# Patient Record
Sex: FEMALE | Race: OTHER | HISPANIC OR LATINO | Employment: UNEMPLOYED | URBAN - METROPOLITAN AREA
[De-identification: names, ages, dates, MRNs, and addresses within clinical notes are randomized per-mention and may not be internally consistent; named-entity substitution may affect disease eponyms.]

---

## 2020-02-24 NOTE — PROGRESS NOTES
Assessment/Plan:    Allergic rhinitis  -recommend that the patient stop taking Benadryl for allergy sx and begin taking Allegra daily for better control   -also recommend that the patient consistently use Flonase daily   -Try to avoid: dust, pollen, mold, animal dander, tobacco smoke     Vertigo  -chronic  -does not take any medication  -is not currently having sx  -discussed that in the future if she has recurring sx  we can try meclizine and vestibular therapy     Family history of breast cancer  -the patient has multiple family members with breast cancer on the maternal side--her mother was positive for breast and uterine cancer   -mammogram ordered--patient has never had a mammogram previously   -no breast issues or pain     Class 2 obesity due to excess calories without serious comorbidity with body mass index (BMI) of 37 0 to 37 9 in adult  -Encouraged diet and lifestyle changes: decrease processed foods (cakes, cookies, chips, soda), decrease total carbohydrate intake, decrease fried/fatty foods, increase fruits and vegetables, increase lean proteins (chicken, turkey), increase healthy fats (avocado, fish, nuts), drink plenty of water (at least four 16 oz bottles per day)  -exercise 3-5 times per day         Return in about 4 weeks (around 3/24/2020) for fu pap smear   Patient Instructions   noel Vickidados ambulatorios   INFORMACIÓN GENERAL:   Las alergias  [place] reacción del sistema inmunológico a berry sustancia llamada alérgeno  El sistema inmunológico lo ve emory berry Peggyann Ang y lo ataca    Los siguientes son los síntomas más comunes:   · Estornudo y flujo, comezón o congestión nasal    · Ojos inflamados, acuosos o con comezón     · Paulo maurer, boca, oídos o garganta    · Inflamación, dolor o comezón en el área de la picadura de insecto  Busque cuidados inmediatos para los siguientes síntomas:   · Dificultad para tragar o inflamación de la garganta o lengua    · Sibilancias o dificultad para respirar    · [employer] o [employer]  [place]  [place]    · Dolor de pecho o palpitaciones irregulares  El tratamiento para las alergias  probablemente incluirá medicamentos para detener el avance de berry reacción alérgica seria  Es probable también que a usted le den [employer] que ayudan a disminuir la comezón, los estornudos y la inflamación o que pueden ayudar a que vargas nariz se sienta menos congestionada  Es posible que vargas proveedor de Pitmanjeet Enciso de distintos tipos de medicamentos para ayudarle a disminuir la inflamación, el enrojecimiento y la comezón  Estos medicamentos pueden venir en píldoras, inyecciones o aplicarse directamente en vargas piel  También puede usarse aerosoles nasales o gotas para los ojos  El tratamiento de desensibilización puede ayudarle a vargas cuerpo a acostumbrarse a los alérgenos que usted no puede evitar  Vargas proveedor de Pitmanjeet Munozes pondrá berry inyección que contiene berry pequeña cantidad de un alérgeno, dándole así a vargas cuerpo un poco más de tiempo para acostumbrarse al alérgeno  Vargas proveedor de priscila lo estará monitoreando de cerca y tratará la reacción alérgica que usted tenga  Vargas reacción al alérgeno puede terminar siendo menos seria después del tratamiento  Pregúntele a vargas proveedor de priscila por cuánto tiempo necesitará usted las inyecciones  [address] alergias:   · Use enjuagues nasales  Los proveedores de priscila podrían sugerir que enjuague los conductos nasales con berry solución salina  El lavado nasal diario podría ayudar a limpiar vargas nariz de alérgenos  · No fume  Es posible que los síntomas de alergia disminuyan si no está alrededor del humo  Si usted fuma, nunca es tarde para dejar de hacerlo  Pregunte a vargas proveedor de priscila por información acerca de cómo hacerlo si necesita ayuda  · [place] berry identificación de [employer]  Usted debería usar joyería de alerta médica o tener berry tarjeta que diga que usted tiene [facility]   Pregunte a vargas proveedor de priscila dónde conseguir berry identificación de alerta médica  Prevenga reacciones alérgicas:   · Evite reacciones a alergias estacionales  No salga afuera cuando el recuento de polen esté muy alto  Jaylin síntomas podrían mejorar si usted sale afuera solo por la mañana o la noche  Use el aire acondicionado y [employer] filtros de aire a [place]  · Desempolve y aspire vargas casa con frecuencia  para evitar reacciones alérgicas al polvo, pelos o moho  Se recomienda ponerse berry mascarilla cuando aspire vargas casa  Mantenga jaylin mascotas en ciertas habitaciones y báñelas con frecuencia  Use berry máquina para reducir la humedad y así ayudar a prevenir el moho  · No use productos que contengan látex  si usted es alérgico al látex  Si usted trabaja en cuidados de la priscila o preparando alimentos, utilice guantes sin látex  Siempre informe a los proveedores de slaud si usted tiene alergia al látex        · Evite picaduras de insectos  Evite áreas o actividades que [address] de [place]  Estos incluyen los botes de basura, la jardinería y wilian de [facility]  No use ropa de colores brillantes o perfumes lauryn al estar afuera  Programe berry adolfo con vargas proveedor de [employer] se le haya indicado: Anote jaylin preguntas para que se acuerde de hacerlas reyna jaylin visitas  ACUERDOS SOBRE VARGAS CUIDADO:   Usted tiene el derecho de participar en la planificación de vargas cuidado  Aprenda todo lo que pueda sobre vargas condición y emory darle tratamiento  Discuta con jaylin médicos jaylin opciones de tratamiento para juntos decidir el cuidado que usted quiere recibir  Usted siempre tiene el derecho a rechazar vargas tratamiento  Esta información es sólo para uso en educación  Vargas intención no es darle un consejo médico sobre enfermedades o tratamientos  Colsulte con vargas Liang Chill farmacéutico antes de seguir cualquier régimen médico para saber si es seguro y efectivo para usted    © 2014 [address] is for End User's use only and may not be sold, redistributed or otherwise used for commercial purposes  All illustrations and images included in CareNotes® are the copyrighted property of A D A M , Inc  or Jose Manuel Romero  Dieta saludable para el corazón   LO QUE NECESITA SABER:   Nicole Alvarado anisha para el corazón es un plan alimenticio bajo en grasas totales, grasas no saludables y sodio (sharon)  Nicole Alvarado saludable para el corazón ayuda a disminuir vargas riesgo de sufrir de enfermedades del Callie Grates y un derrame cerebral  Limite la cantidad de grasa que consume entre un 25% a 35% del total de josse calorías diarias  Limite el sodio por debajo de los 2,300 mg al día  [address]:   Grasas saludables:  Las grasas saludables ayudan a mejorar los niveles de [place]  El riesgo de berry enfermedad cardíaca se disminuye cuando los niveles de colesterol son normales  Escoja grasas saludables emory las siguientes:  · Las grasas no saturadas  se encuentran en alimentos emory el frijol de soja, aceites de [place], de [place], de [place] y de [place]  Se encuentra también en la margarina suave hecha con aceite líquido vegetal      · Las grasas Omega 3  se encuentran en ciertos pescados, emory el salmón, el atún y la [place], en las nueces y en la semilla de andrea  Grasas no saludables:  Las grasas "malas" pueden causar niveles perjudiciales de colesterol en vargas adama y aumentar el riesgo de berry enfermedad cardíaca  Limite el consumo de grasas no saludables, emory los siguientes:  · El colesterol  se encuentra en alimentos de origen animal, emory los huevos y la langosta al igual que en productos lácteos hechos con leche entera  Limite el consumo de colesterol a menos de 300 milligramos (mg) al día  Es posible que necesite limitar el colesterol a 200 mg al día si sufre de berry enfermedad cardíaca  · Las grasas saturadas  se encuentran en kaitlyn emory el tocino y la hamburguesa   Estas se encuentran también en la piel del good y del Diandra Anderson y mantequilla  Limite el consumo de grasas saturadas a menos del 7% del total de josse calorías diarias  Limite las grasas saturadas a menos del 6% si usted tiene enfermedad cardíaca o tiene un mayor riesgo para esto  · La grasa trans  se encuentran en los alimentos envasados, emory las papitas de bolsa y las [place]  También se encuentra en la margarina dura, algunos alimentos fritos y la manteca  Evite lo más que [employer] trans  Alimentos y bebidas saludables para el corazón para incluir:  Solicite que vargas nutricionista o el médico le indique cuántas porciones necesita consumir de los siguientes alimentos de cada valerie alimenticio:  · Granos:      ¨ Panes, cereales y pastas de harina integral y Marcy Gallery integral    ¨ Patatas fritas y galletas saladas bajas en grasa, bajas en sodio    · Verduras:      ¨ Brócoli, judías verdes, guisantes y espinacas    ¨ [place], col y habas    ¨ Zanahorias, patatas dulces, tomates y pimientos    ¨ Vegetales enlatados sin sharon añadida    · Frutas:      ¨ Plátanos, melocotones, peras y levin    ¨ Uvas, pasas y dátiles    ¨ Anyi, Raheem Che, Yue, [facility] y [facility]    ¨ [place], [place], melón y papaya    ¨ Clemente mount y fresas    ¨ Conservas de frutas sin azúcares añadidos    · Productos lácteos bajos en grasa:      ¨ Leche sin grasa (descremada), leche 1%, leche baja en grasa de [place], anacardo o leche de soja fortificada con calcio    ¨ Queso cottage, queso bajo en grasa y yogur regular o congelado    · Teo y proteínas , chester de carne [place] de res o cerdo (chuletas, pierna, oseas), el good y el pavo sin piel, legumbres, productos de soya, las claras del huevo y nueces o kina secos    Alimentos y bebidas que debe limitar o evitar:  Pregúntele a vargas médico o nutricionista sobre estos y otros alimentos que contienen altos niveles de maria guadalupe Calzada y Ramin que no son saludables:  · [employer] , emory las comidas Chau Nix con queso y cereales con más de 300 mg de sodio por porción    · Productos enlatados o mezclas secas  para pasteles, sopas o salsas    · Verduras con sodio agregada , emory las marilu instantáneas, verduras con salsas agregadas o conservas regulares de verduras    · Otros alimentos altos en sodio , emory la salsa de [place], salsa de [facility], aderezo para [place], encurtidos de [place], UPPER STONE, salsa de soya y miso    · Alimentos lácteos con alto contenido de grasa  emory leche entera o 2%, queso crema o crema agria y quesos     · Alimentos con proteínas altas en grasa  chester de carne ([address], las chuletas con hueso), el good o pavo sin piel y las vísceras de animal emory el hígado    · Teo curadas o [place] , emory las salchichas, el tocino y salchichones    · Aceites y grasas poco saludables , emory la [place], margarina en Anh Crumb y aceites para cocinar emory el aceite de ted o arellano    · Alimentos y bebidas altas en azúcar , tales emory refrescos (gaseosas), bebidas deportivas, té azucarado, dulces, pasteles, galletas, tartas y donas  Otras pautas dietéticas que debe seguir:   · Consuma más alimentos que contengan grasas Omega-3  Consuma pescado con altas cantidades de Omega-3 por lo menos 2 veces a la semana  · Limite el consumo de alcohol  Demasiado alcohol puede dañar vargas corazón y elevar vargas presión arterial  Las mujeres deberían limitar el consumo de alcohol a 1 bebida por día  Los hombres deberían limitar el consumo de alcohol a 2 tragos al día  Un trago equivale a 12 onzas de cerveza, 5 onzas de vino o 1 onza y ½ de licor  · Escoja alimentos bajos en sodio  Alimentos altos de sodio pueden conducir a la hipertensión  Al preparar la comida añada muy poca sal o no use sal  Use hierbas y especias en vez de la sal     · Consuma más fibra  para ayudar a bajar los niveles de [place]  Consuma al menos 5 porciones de frutas y verduras todos los días   Consuma 3 onzas de alimentos integrales al día  Obey Arnulfo (fríjoles) son [facility] buena sean de [place]  Pautas de estilo de diego:   · No fume  La nicotina y otros químicos contenidos en los cigarrillos y cigarros pueden causar daño a josse pulmones y el corazón  Pida información a gardner médico si usted actualmente fuma y necesita ayuda para dejar de fumar  Los cigarrillos electrónicos o tabaco sin humo todavía contienen nicotina  Consulte con gardner médico antes de [employer]  · Lorelle Cecilia regularmente  para que le ayude a mantener un peso saludable y mejorar gardner presión arterial y niveles de colesterol  Pregunte a gardner médico acerca del mejor plan de ejercicio para usted  No empiece un programa de ejercicios sin antes consultar con gardner Sunil Parcel a josse consultas de control con gardner médico según le indicaron  Anote josse preguntas para que se acuerde de hacerlas reyna josse visitas  © 2017 [facility] Information is for End User's use only and may not be sold, redistributed or otherwise used for commercial purposes  All illustrations and images included in CareNotes® are the copyrighted property of A D A M , Inc  or Jose Manuel Romero  Esta información es sólo para uso en educación  Gardner intención no es darle un consejo médico sobre enfermedades o tratamientos  Colsulte con gardner Clarine Sours farmacéutico antes de seguir cualquier régimen médico para saber si es seguro y efectivo para usted  Diagnoses and all orders for this visit:    Allergic rhinitis, unspecified seasonality, unspecified trigger  -     albuterol (PROVENTIL HFA,VENTOLIN HFA) 90 mcg/act inhaler; Inhale 2 puffs every 6 (six) hours as needed for wheezing or shortness of breath  -     fluticasone (FLONASE) 50 mcg/act nasal spray; 1 spray into each nostril daily  -     fexofenadine (ALLEGRA) 180 MG tablet;  Take 1 tablet (180 mg total) by mouth daily    Cervical cancer screening    Breast cancer screening  -     Mammo screening bilateral w cad; Future    Class 2 obesity due to excess calories without serious comorbidity with body mass index (BMI) of 37 0 to 37 9 in adult  -     CBC and differential; Future  -     Comprehensive metabolic panel; Future  -     Hemoglobin A1C; Future  -     Lipid panel; Future  -     TSH, 3rd generation with Free T4 reflex; Future    Screening for HIV (human immunodeficiency virus)  -     Human Immunodeficiency Virus 1/2 Antigen / Antibody ( Fourth Generation) with Reflex Testing; Future    Family history of breast cancer    Vertigo    Other orders  -     diphenhydrAMINE (BENADRYL) 25 mg tablet; Take 50 mg by mouth daily at bedtime as needed for itching          Subjective:     Quoc Peguero is a 37 y o  female who  has no past medical history on file  who presented to the office today for establish care  HPI  Patient presents today to establish care  She has a PMH of allergic rhinitis, vertigo  She has a PMH of cholecystectomy, , and tubal ligation  She reports that she is currently taking 50 mg of Benadryl at HS for allergy sx  She reports her sx are not well controlled  She has previously tried Zyrtec and Allegra and reports that they are not effective after a while  She has a family hx significant for breast cancer in several members of her maternal family including her mother who also passed away due to uterine cancer at 51 yo  The patient reports that her menses are regular with no issues  She denies use of any tobacco, drugs, or alcohol       The following portions of the patient's history were reviewed and updated as appropriate: allergies, current medications, past family history, past medical history, past social history, past surgical history and problem list     Current Outpatient Medications on File Prior to Visit   Medication Sig Dispense Refill    diphenhydrAMINE (BENADRYL) 25 mg tablet Take 50 mg by mouth daily at bedtime as needed for itching       No current facility-administered medications on file prior to visit  Review of Systems   Constitutional: Negative for activity change, appetite change, chills, fatigue, fever and unexpected weight change  HENT: Positive for congestion and postnasal drip  Negative for hearing loss, nosebleeds, sinus pain, sneezing, sore throat and trouble swallowing  Eyes: Positive for itching  Negative for photophobia and visual disturbance  Respiratory: Negative for cough, chest tightness, shortness of breath and wheezing  Cardiovascular: Negative for chest pain, palpitations and leg swelling  Gastrointestinal: Negative for abdominal pain, constipation, nausea and vomiting  Genitourinary: Negative for decreased urine volume, difficulty urinating, dysuria, flank pain, genital sores, hematuria and urgency  Musculoskeletal: Negative for back pain and gait problem  Skin: Negative for pallor, rash and wound  Neurological: Negative for dizziness, seizures, syncope, weakness, numbness and headaches  Hematological: Negative for adenopathy  Does not bruise/bleed easily  Psychiatric/Behavioral: Negative for confusion, hallucinations, self-injury, sleep disturbance and suicidal ideas  The patient is not nervous/anxious  BMI Counseling: Body mass index is 37 68 kg/m²  The BMI is above normal  Nutrition recommendations include decreasing portion sizes, encouraging healthy choices of fruits and vegetables, decreasing fast food intake, consuming healthier snacks and limiting drinks that contain sugar  Objective:    /70 (BP Location: Left arm, Patient Position: Sitting, Cuff Size: Standard)   Pulse 78   Temp (!) 97 2 °F (36 2 °C) (Temporal)   Resp 13   Ht 5' 2" (1 575 m)   Wt 93 4 kg (206 lb)   LMP 02/06/2020   SpO2 98%   BMI 37 68 kg/m²     Physical Exam   Constitutional: She is oriented to person, place, and time  She appears well-developed and well-nourished  No distress     HENT:   Head: Normocephalic and atraumatic  Right Ear: Tympanic membrane normal    Left Ear: Tympanic membrane normal    Nose: Mucosal edema present  +PND   Eyes: Pupils are equal, round, and reactive to light  EOM are normal    Neck: Normal range of motion  Neck supple  Cardiovascular: Normal rate, regular rhythm, normal heart sounds and intact distal pulses  Exam reveals no gallop and no friction rub  No murmur heard  Pulmonary/Chest: Effort normal and breath sounds normal  No respiratory distress  She has no wheezes  Abdominal: Soft  Bowel sounds are normal  She exhibits no distension  There is no tenderness  There is no rebound and no guarding  Musculoskeletal: Normal range of motion  She exhibits no edema or deformity  Lymphadenopathy:     She has no cervical adenopathy  Neurological: She is alert and oriented to person, place, and time  She displays normal reflexes  No sensory deficit  Coordination normal    Skin: Skin is warm and dry  Capillary refill takes less than 2 seconds  No rash noted  She is not diaphoretic  Psychiatric: She has a normal mood and affect  Her behavior is normal    Nursing note and vitals reviewed        MIRANDA Stark  02/25/20  11:46 AM

## 2020-02-25 ENCOUNTER — OFFICE VISIT (OUTPATIENT)
Dept: FAMILY MEDICINE CLINIC | Facility: CLINIC | Age: 40
End: 2020-02-25

## 2020-02-25 VITALS
BODY MASS INDEX: 37.91 KG/M2 | HEART RATE: 78 BPM | WEIGHT: 206 LBS | RESPIRATION RATE: 13 BRPM | HEIGHT: 62 IN | TEMPERATURE: 97.2 F | OXYGEN SATURATION: 98 % | SYSTOLIC BLOOD PRESSURE: 124 MMHG | DIASTOLIC BLOOD PRESSURE: 70 MMHG

## 2020-02-25 DIAGNOSIS — E66.09 CLASS 2 OBESITY DUE TO EXCESS CALORIES WITHOUT SERIOUS COMORBIDITY WITH BODY MASS INDEX (BMI) OF 37.0 TO 37.9 IN ADULT: ICD-10-CM

## 2020-02-25 DIAGNOSIS — R42 VERTIGO: ICD-10-CM

## 2020-02-25 DIAGNOSIS — J30.9 ALLERGIC RHINITIS, UNSPECIFIED SEASONALITY, UNSPECIFIED TRIGGER: Primary | ICD-10-CM

## 2020-02-25 DIAGNOSIS — Z12.39 BREAST CANCER SCREENING: ICD-10-CM

## 2020-02-25 DIAGNOSIS — Z12.4 CERVICAL CANCER SCREENING: ICD-10-CM

## 2020-02-25 DIAGNOSIS — Z80.3 FAMILY HISTORY OF BREAST CANCER: ICD-10-CM

## 2020-02-25 DIAGNOSIS — Z11.4 SCREENING FOR HIV (HUMAN IMMUNODEFICIENCY VIRUS): ICD-10-CM

## 2020-02-25 PROBLEM — E66.812 CLASS 2 OBESITY DUE TO EXCESS CALORIES WITHOUT SERIOUS COMORBIDITY WITH BODY MASS INDEX (BMI) OF 37.0 TO 37.9 IN ADULT: Status: ACTIVE | Noted: 2020-02-25

## 2020-02-25 PROCEDURE — 1036F TOBACCO NON-USER: CPT | Performed by: NURSE PRACTITIONER

## 2020-02-25 PROCEDURE — 99204 OFFICE O/P NEW MOD 45 MIN: CPT | Performed by: NURSE PRACTITIONER

## 2020-02-25 PROCEDURE — 3008F BODY MASS INDEX DOCD: CPT | Performed by: NURSE PRACTITIONER

## 2020-02-25 RX ORDER — FEXOFENADINE HCL 180 MG/1
180 TABLET ORAL DAILY
Qty: 90 TABLET | Refills: 2 | Status: SHIPPED | OUTPATIENT
Start: 2020-02-25 | End: 2021-11-17

## 2020-02-25 RX ORDER — ALBUTEROL SULFATE 90 UG/1
2 AEROSOL, METERED RESPIRATORY (INHALATION) EVERY 6 HOURS PRN
Qty: 1 INHALER | Refills: 5 | Status: SHIPPED | OUTPATIENT
Start: 2020-02-25 | End: 2021-11-17

## 2020-02-25 RX ORDER — DIPHENHYDRAMINE HCL 25 MG
50 TABLET ORAL
COMMUNITY

## 2020-02-25 RX ORDER — FLUTICASONE PROPIONATE 50 MCG
1 SPRAY, SUSPENSION (ML) NASAL DAILY
Qty: 1 BOTTLE | Refills: 3 | Status: SHIPPED | OUTPATIENT
Start: 2020-02-25 | End: 2021-11-17 | Stop reason: SDUPTHER

## 2020-02-25 NOTE — PATIENT INSTRUCTIONS
Alergias, cuidados ambulatorios   INFORMACIÓN GENERAL:   Las alergias  son Babetta Budds reacción del sistema inmunológico a berry sustancia llamada alérgeno  El sistema inmunológico lo ve emory berry Candice  y lo ataca  Los siguientes son los síntomas más comunes:   · Estornudo y flujo, comezón o congestión nasal    · Ojos inflamados, acuosos o con comezón     · Paulo maurer, boca, oídos o garganta    · Inflamación, dolor o comezón en el área de la picadura de insecto  Busque cuidados inmediatos para los siguientes síntomas:   · Dificultad para tragar o inflamación de la garganta o lengua    · Sibilancias o dificultad para respirar    · The TJX independenceIT o LEV Correa    · Dolor de pecho o palpitaciones irregulares  El tratamiento para las alergias  probablemente incluirá medicamentos para detener el avance de berry reacción alérgica seria  Es probable también que a usted le den [employer] que ayudan a disminuir la comezón, los estornudos y la inflamación o que pueden ayudar a que gardner nariz se sienta menos congestionada  Es posible que gardner proveedor de July Enciso de distintos tipos de medicamentos para ayudarle a disminuir la inflamación, el enrojecimiento y la comezón  Estos medicamentos pueden venir en píldoras, inyecciones o aplicarse directamente en gardner piel  También puede usarse aerosoles nasales o gotas para los ojos  El tratamiento de desensibilización puede ayudarle a gardner cuerpo a acostumbrarse a los alérgenos que usted no puede evitar  Gardner proveedor de July Enciso pondrá berry inyección que contiene berry pequeña cantidad de un alérgeno, dándole así a gardner cuerpo un poco más de tiempo para acostumbrarse al alérgeno  Gardner proveedor de priscila lo estará monitoreando de cerca y tratará la reacción alérgica que usted tenga  Gardner reacción al alérgeno puede terminar siendo menos seria después del tratamiento  Pregúntele a gardner proveedor de priscila por cuánto tiempo necesitará usted las inyecciones  [address] alergias:   · Use enjuagues nasales    Los proveedores de priscila podrían sugerir que enjuague los conductos nasales con berry solución salina  El lavado nasal diario podría ayudar a limpiar vargas nariz de alérgenos  · No fume  Es posible que los síntomas de alergia disminuyan si no está alrededor del humo  Si usted fuma, nunca es tarde para dejar de hacerlo  Pregunte a vargas proveedor de priscila por información acerca de cómo hacerlo si necesita ayuda  · [place] berry identificación de [employer]  Usted debería usar joyería de alerta médica o tener berry tarjeta que diga que usted tiene [place]  Pregunte a vargas proveedor de priscila dónde conseguir berry identificación de alerta médica  Prevenga reacciones alérgicas:   · Evite reacciones a alergias estacionales  No salga afuera cuando el recuento de polen esté muy alto  Jaylin síntomas podrían mejorar si usted sale afuera solo por la mañana o la noche  Use el aire acondicionado y [employer] filtros de aire a [place]  · Desempolve y aspire vargas casa con frecuencia  para evitar reacciones alérgicas al polvo, pelos o moho  Se recomienda ponerse berry mascarilla cuando aspire vargas casa  Mantenga jaylin mascotas en ciertas habitaciones y báñelas con frecuencia  Use berry máquina para reducir la humedad y así ayudar a prevenir el moho  · No use productos que contengan látex  si usted es alérgico al látex  Si usted trabaja en cuidados de la priscila o preparando alimentos, utilice guantes sin látex  Siempre informe a los proveedores de slaud si usted tiene alergia al látex        · Evite picaduras de insectos  Evite áreas o actividades que [address] de [place]  Estos incluyen los botes de basura, la jardinería y wilian de [facility]  No use ropa de colores brillantes o perfumes lauryn al estar afuera  Programe berry adolfo con vargas proveedor de [employer] se le haya indicado: Anote jaylin preguntas para que se acuerde de hacerlas reyna jaylin visitas    ACUERDOS SOBRE VARGAS CUIDADO:   Usted tiene el derecho de participar en la planificación de gardner cuidado  Aprenda todo lo que pueda sobre gardner condición y emory darle tratamiento  Discuta con josse médicos josse opciones de tratamiento para juntos decidir el cuidado que usted quiere recibir  Usted siempre tiene el derecho a rechazar gardner tratamiento  Esta información es sólo para uso en educación  Gardner intención no es darle un consejo médico sobre enfermedades o tratamientos  Colsulte con gardner [place] Canny farmacéutico antes de seguir cualquier régimen médico para saber si es seguro y efectivo para usted  © 2014 [address] is for End User's use only and may not be sold, redistributed or otherwise used for commercial purposes  All illustrations and images included in CareNotes® are the copyrighted property of A D A M , Inc  or Jose Manuel Romero  Dieta saludable para el corazón   LO QUE NECESITA SABER:   Wonda Merry anisha para el corazón es un plan alimenticio bajo en grasas totales, grasas no saludables y sodio (sharon)  Wonda Merry saludable para el corazón ayuda a disminuir gardner riesgo de sufrir de enfermedades del Jackquline Feeling y un derrame cerebral  Limite la cantidad de grasa que consume entre un 25% a 35% del total de josse calorías diarias  Limite el sodio por debajo de los 2,300 mg al día  [address]:   Grasas saludables:  Las grasas saludables ayudan a mejorar los niveles de [place]  El riesgo de berry enfermedad cardíaca se disminuye cuando los niveles de colesterol son normales  Escoja grasas saludables emory las siguientes:  · Las grasas no saturadas  se encuentran en alimentos emory el frijol de soja, aceites de canola, de [place], de [place] y de [place]  Se encuentra también en la margarina suave hecha con aceite líquido vegetal      · Las grasas Omega 3  se encuentran en ciertos pescados, emory el salmón, el atún y la [place], en las nueces y en la semilla de andrea    Grasas no saludables:  Las grasas "malas" pueden causar [employer] perjudiciales de colesterol en vargas adama y aumentar el riesgo de berry enfermedad cardíaca  Limite el consumo de grasas no saludables, emory los siguientes:  · El colesterol  se encuentra en alimentos de origen animal, emory los huevos y la langosta al igual que en productos lácteos hechos con leche entera  Limite el consumo de colesterol a menos de 300 milligramos (mg) al día  Es posible que necesite limitar el colesterol a 200 mg al día si sufre de berry enfermedad cardíaca  · Las grasas saturadas  se encuentran en kaitlyn emory el tocino y la hamburguesa  Estas se encuentran también en la piel del good y del [place], [place] entera y [place]  Limite el consumo de grasas saturadas a menos del 7% del total de josse calorías diarias  Limite las grasas saturadas a menos del 6% si usted tiene enfermedad cardíaca o tiene un mayor riesgo para esto  · La grasa trans  se encuentran en los alimentos envasados, emory las papitas de bolsa y las [place]  También se encuentra en la margarina dura, algunos alimentos fritos y la manteca  Evite lo más que [employer] trans    Alimentos y bebidas saludables para el corazón para incluir:  Solicite que vargas nutricionista o el médico le indique cuántas porciones necesita consumir de los siguientes alimentos de cada valerie alimenticio:  · Granos:      ¨ Panes, cereales y pastas de harina integral y Hulon Tripathi integral    ¨ Patatas fritas y galletas saladas bajas en grasa, bajas en sodio    · Verduras:      ¨ Brócoli, judías verdes, guisantes y espinacas    ¨ [place], col y habas    ¨ Zanahorias, patatas dulces, tomates y pimientos    ¨ Vegetales enlatados sin sharon añadida    · Frutas:      ¨ Plátanos, melocotones, peras y levin    ¨ Uvas, pasas y dátiles    ¨ Raheem De Santiago, Yue, [facility] de [facility] y [facility]    ¨ [place], [place], melón y papaya    ¨ Clemente mount y fresas    ¨ Conservas de frutas sin azúcares añadidos    · Productos lácteos bajos en grasa:      Fam General sin grasa (descremada), leche 1%, leche baja en grasa de [place], anacardo o [place] de soja fortificada con calcio    ¨ Queso cottage, queso bajo en grasa y yogur regular o congelado    · Rebeccaside y proteínas , chester de carne [place] de res o cerdo (chuletas, pierna, oseas), el good y el pavo sin piel, legumbres, productos de soya, las claras del huevo y nueces o kina secos  Alimentos y bebidas que debe limitar o evitar:  Pregúntele a vargas médico o nutricionista sobre estos y otros alimentos que contienen altos niveles de maria guadalupe Calzada y Ramin que no son saludables:  · [employer] , emory las comidas congeladas, [place], macarrón con queso y cereales con más de 300 mg de sodio por porción    · Productos enlatados o mezclas secas  para pasteles, sopas o salsas    · Verduras con sodio agregada , emory las marilu instantáneas, verduras con salsas agregadas o conservas regulares de verduras    · Otros alimentos altos en sodio , emory la salsa de [place], salsa de [facility], aderezo para [place], encurtidos de [place], UPPER [place], salsa de soya y miso    · Alimentos lácteos con alto contenido de grasa  emory leche entera o 2%, queso crema o crema agria y quesos     · Alimentos con proteínas altas en grasa  chester de carne ([address], las chuletas con hueso), el good o pavo sin piel y las vísceras de animal emory el hígado    · Teo curadas o [place] , emory las salchichas, el tocino y salchichones    · Aceites y grasas poco saludables , emory la [place], margarina en Cale Gamma y aceites para cocinar emory el aceite de ted o arellano    · Alimentos y bebidas altas en azúcar , tales emory refrescos (gaseosas), bebidas deportivas, té azucarado, dulces, pasteles, galletas, tartas y donas  Otras pautas dietéticas que debe seguir:   · Consuma más alimentos que contengan grasas Omega-3  Consuma pescado con altas cantidades de Omega-3 por lo menos 2 veces a la semana  · Limite el consumo de alcohol    Akash Robert alcohol puede dañar gardner corazón y elevar gardner presión arterial  Las mujeres deberían limitar el consumo de alcohol a 1 bebida por día  Los hombres deberían limitar el consumo de alcohol a 2 tragos al día  Un trago equivale a 12 onzas de cerveza, 5 onzas de vino o 1 onza y ½ de licor  · Escoja alimentos bajos en sodio  Alimentos altos de sodio pueden conducir a la hipertensión  Al preparar la comida añada muy poca sal o no use sal  Use hierbas y especias en vez de la sal     · Consuma más fibra  para ayudar a bajar los niveles de [place]  Consuma al menos 5 porciones de frutas y verduras todos los días  Consuma 3 onzas de alimentos integrales al día  Janel Mercedes [facility]) son Evern Naas buena sean de [place]  [place] de estilo de diego:   · No fume  La nicotina y otros químicos contenidos en los cigarrillos y cigarros pueden causar daño a josse pulmones y el corazón  Pida información a gardner médico si usted actualmente fuma y necesita ayuda para dejar de fumar  Los cigarrillos electrónicos o tabaco sin humo todavía contienen nicotina  Consulte con gardner médico antes de [employer]  · Arby Sniff regularmente  para que le ayude a mantener un peso saludable y mejorar gardner presión arterial y niveles de colesterol  Pregunte a gardner médico acerca del mejor plan de ejercicio para usted  No empiece un programa de ejercicios sin antes consultar con gardner Tara Eth a josse consultas de control con gardner médico según le indicaron  Anote josse preguntas para que se acuerde de hacerlas reyna josse visitas  © 2017 [address] is for End User's use only and may not be sold, redistributed or otherwise used for commercial purposes  All illustrations and images included in CareNotes® are the copyrighted property of A D A M , Inc  or Jose Manuel Romero  Esta información es sólo para uso en educación  Gardner intención no es darle un consejo médico sobre enfermedades o tratamientos   Colsulte con gardner Chastity Lopezuver farmacéutico antes de seguir cualquier régimen médico para saber si es seguro y efectivo para usted

## 2020-02-25 NOTE — ASSESSMENT & PLAN NOTE
-Encouraged diet and lifestyle changes: decrease processed foods (cakes, cookies, chips, soda), decrease total carbohydrate intake, decrease fried/fatty foods, increase fruits and vegetables, increase lean proteins (chicken, turkey), increase healthy fats (avocado, fish, nuts), drink plenty of water (at least four 16 oz bottles per day)  -exercise 3-5 times per day

## 2020-02-25 NOTE — ASSESSMENT & PLAN NOTE
-chronic  -does not take any medication  -is not currently having sx  -discussed that in the future if she has recurring sx  we can try meclizine and vestibular therapy

## 2020-02-25 NOTE — ASSESSMENT & PLAN NOTE
-recommend that the patient stop taking Benadryl for allergy sx and begin taking Allegra daily for better control   -also recommend that the patient consistently use Flonase daily   -Try to avoid: dust, pollen, mold, animal dander, tobacco smoke

## 2020-02-25 NOTE — ASSESSMENT & PLAN NOTE
-the patient has multiple family members with breast cancer on the maternal side--her mother was positive for breast and uterine cancer   -mammogram ordered--patient has never had a mammogram previously   -no breast issues or pain

## 2020-02-27 ENCOUNTER — APPOINTMENT (OUTPATIENT)
Dept: LAB | Facility: CLINIC | Age: 40
End: 2020-02-27
Payer: COMMERCIAL

## 2020-02-27 DIAGNOSIS — E66.09 CLASS 2 OBESITY DUE TO EXCESS CALORIES WITHOUT SERIOUS COMORBIDITY WITH BODY MASS INDEX (BMI) OF 37.0 TO 37.9 IN ADULT: ICD-10-CM

## 2020-02-27 DIAGNOSIS — Z11.4 SCREENING FOR HIV (HUMAN IMMUNODEFICIENCY VIRUS): ICD-10-CM

## 2020-02-27 LAB
ALBUMIN SERPL BCP-MCNC: 4.1 G/DL (ref 3.5–5)
ALP SERPL-CCNC: 68 U/L (ref 46–116)
ALT SERPL W P-5'-P-CCNC: 18 U/L (ref 12–78)
ANION GAP SERPL CALCULATED.3IONS-SCNC: 6 MMOL/L (ref 4–13)
AST SERPL W P-5'-P-CCNC: 10 U/L (ref 5–45)
BASOPHILS # BLD AUTO: 0.09 THOUSANDS/ΜL (ref 0–0.1)
BASOPHILS NFR BLD AUTO: 1 % (ref 0–1)
BILIRUB SERPL-MCNC: 0.46 MG/DL (ref 0.2–1)
BUN SERPL-MCNC: 12 MG/DL (ref 5–25)
CALCIUM SERPL-MCNC: 8.7 MG/DL (ref 8.3–10.1)
CHLORIDE SERPL-SCNC: 109 MMOL/L (ref 100–108)
CHOLEST SERPL-MCNC: 158 MG/DL (ref 50–200)
CO2 SERPL-SCNC: 23 MMOL/L (ref 21–32)
CREAT SERPL-MCNC: 0.84 MG/DL (ref 0.6–1.3)
EOSINOPHIL # BLD AUTO: 0.07 THOUSAND/ΜL (ref 0–0.61)
EOSINOPHIL NFR BLD AUTO: 1 % (ref 0–6)
ERYTHROCYTE [DISTWIDTH] IN BLOOD BY AUTOMATED COUNT: 14 % (ref 11.6–15.1)
EST. AVERAGE GLUCOSE BLD GHB EST-MCNC: 103 MG/DL
GFR SERPL CREATININE-BSD FRML MDRD: 87 ML/MIN/1.73SQ M
GLUCOSE P FAST SERPL-MCNC: 74 MG/DL (ref 65–99)
HBA1C MFR BLD: 5.2 %
HCT VFR BLD AUTO: 42 % (ref 34.8–46.1)
HDLC SERPL-MCNC: 51 MG/DL
HGB BLD-MCNC: 13 G/DL (ref 11.5–15.4)
IMM GRANULOCYTES # BLD AUTO: 0.03 THOUSAND/UL (ref 0–0.2)
IMM GRANULOCYTES NFR BLD AUTO: 0 % (ref 0–2)
LDLC SERPL CALC-MCNC: 91 MG/DL (ref 0–100)
LYMPHOCYTES # BLD AUTO: 2.28 THOUSANDS/ΜL (ref 0.6–4.47)
LYMPHOCYTES NFR BLD AUTO: 29 % (ref 14–44)
MCH RBC QN AUTO: 29.1 PG (ref 26.8–34.3)
MCHC RBC AUTO-ENTMCNC: 31 G/DL (ref 31.4–37.4)
MCV RBC AUTO: 94 FL (ref 82–98)
MONOCYTES # BLD AUTO: 0.58 THOUSAND/ΜL (ref 0.17–1.22)
MONOCYTES NFR BLD AUTO: 7 % (ref 4–12)
NEUTROPHILS # BLD AUTO: 4.81 THOUSANDS/ΜL (ref 1.85–7.62)
NEUTS SEG NFR BLD AUTO: 62 % (ref 43–75)
NONHDLC SERPL-MCNC: 107 MG/DL
NRBC BLD AUTO-RTO: 0 /100 WBCS
PLATELET # BLD AUTO: 363 THOUSANDS/UL (ref 149–390)
PMV BLD AUTO: 11.9 FL (ref 8.9–12.7)
POTASSIUM SERPL-SCNC: 3.6 MMOL/L (ref 3.5–5.3)
PROT SERPL-MCNC: 7.7 G/DL (ref 6.4–8.2)
RBC # BLD AUTO: 4.46 MILLION/UL (ref 3.81–5.12)
SODIUM SERPL-SCNC: 138 MMOL/L (ref 136–145)
TRIGL SERPL-MCNC: 79 MG/DL
TSH SERPL DL<=0.05 MIU/L-ACNC: 1.69 UIU/ML (ref 0.36–3.74)
WBC # BLD AUTO: 7.86 THOUSAND/UL (ref 4.31–10.16)

## 2020-02-27 PROCEDURE — 87389 HIV-1 AG W/HIV-1&-2 AB AG IA: CPT

## 2020-02-27 PROCEDURE — 85025 COMPLETE CBC W/AUTO DIFF WBC: CPT

## 2020-02-27 PROCEDURE — 80061 LIPID PANEL: CPT

## 2020-02-27 PROCEDURE — 80053 COMPREHEN METABOLIC PANEL: CPT

## 2020-02-27 PROCEDURE — 84443 ASSAY THYROID STIM HORMONE: CPT

## 2020-02-27 PROCEDURE — 83036 HEMOGLOBIN GLYCOSYLATED A1C: CPT

## 2020-02-27 PROCEDURE — 36415 COLL VENOUS BLD VENIPUNCTURE: CPT

## 2020-02-28 LAB — HIV 1+2 AB+HIV1 P24 AG SERPL QL IA: NORMAL

## 2020-08-27 ENCOUNTER — HOSPITAL ENCOUNTER (EMERGENCY)
Facility: HOSPITAL | Age: 40
Discharge: HOME | End: 2020-08-27
Attending: EMERGENCY MEDICINE | Admitting: EMERGENCY MEDICINE
Payer: COMMERCIAL

## 2020-08-27 VITALS
HEART RATE: 80 BPM | DIASTOLIC BLOOD PRESSURE: 78 MMHG | TEMPERATURE: 98.9 F | SYSTOLIC BLOOD PRESSURE: 148 MMHG | RESPIRATION RATE: 16 BRPM | OXYGEN SATURATION: 99 % | WEIGHT: 209 LBS | BODY MASS INDEX: 38.23 KG/M2

## 2020-08-27 DIAGNOSIS — M79.641 RIGHT HAND PAIN: Primary | ICD-10-CM

## 2020-08-27 PROCEDURE — 99284 EMERGENCY DEPT VISIT MOD MDM: CPT | Performed by: PHYSICIAN ASSISTANT

## 2020-08-27 PROCEDURE — 99283 EMERGENCY DEPT VISIT LOW MDM: CPT

## 2020-08-27 RX ORDER — NAPROXEN 500 MG/1
500 TABLET ORAL 2 TIMES DAILY WITH MEALS
Qty: 20 TABLET | Refills: 0 | Status: SHIPPED | OUTPATIENT
Start: 2020-08-27 | End: 2021-11-17 | Stop reason: SDUPTHER

## 2020-08-27 RX ORDER — ACETAMINOPHEN 500 MG
500 TABLET ORAL EVERY 6 HOURS PRN
Qty: 30 TABLET | Refills: 0 | Status: SHIPPED | OUTPATIENT
Start: 2020-08-27

## 2020-08-27 NOTE — Clinical Note
Jason Vasquez was seen and treated in our emergency department on 8/27/2020  Diagnosis:     Leola Hirsch  may return to work on return date  She may return on this date: 08/28/2020         If you have any questions or concerns, please don't hesitate to call        Nicole Bang RN    ______________________________           _______________          _______________  Hospital Representative                              Date                                Time

## 2020-08-27 NOTE — ED PROVIDER NOTES
History  Chief Complaint   Patient presents with    Hand Pain     Patient reports right hand pain for one week  Reports working with her hands at her job  Patient is a 78-year-old right-handed female presents today for evaluation of right hand pain ongoing for the past week patient reports she began a new job 3 weeks ago and has repetitive movements at a fast pace that require fine motor skills on her right hand  Patient denies any direct traumatic inciting events, numbness, tingling, weakness, paresthesias, paralysis associated with the pain  Patient reports it is an aching pain which radiates across all of her fingers  Patient reports she feels as though her fingers are swollen the morning however reports the swelling decreases throughout the day patient denies any redness, warmth, injuries wounds or rashes of any kind  Patient reports he has been taking Motrin with moderate relief for symptoms reports she was unable to go to work today due to the hand pain and will need a note  Patient declines imaging  History provided by:  Patient  Hand Pain   Location:  Right hand and fingers  Quality:  Aching  Severity:  Moderate  Onset quality:  Gradual  Duration:  1 week  Timing:  Constant  Progression:  Unchanged  Chronicity:  New  Context:  Repetitive movement with fine motor skills  Relieved by:  Motrin  Worsened by: Movements  Ineffective treatments:  Motrin  Associated symptoms: no abdominal pain, no chest pain, no congestion, no ear pain, no fatigue, no fever, no nausea, no rash, no rhinorrhea, no shortness of breath, no sore throat and no vomiting        Prior to Admission Medications   Prescriptions Last Dose Informant Patient Reported? Taking?    albuterol (PROVENTIL HFA,VENTOLIN HFA) 90 mcg/act inhaler   No No   Sig: Inhale 2 puffs every 6 (six) hours as needed for wheezing or shortness of breath   diphenhydrAMINE (BENADRYL) 25 mg tablet   Yes No   Sig: Take 50 mg by mouth daily at bedtime as needed for itching   fexofenadine (ALLEGRA) 180 MG tablet   No No   Sig: Take 1 tablet (180 mg total) by mouth daily   fluticasone (FLONASE) 50 mcg/act nasal spray   No No   Si spray into each nostril daily      Facility-Administered Medications: None       History reviewed  No pertinent past medical history  Past Surgical History:   Procedure Laterality Date     SECTION      TUBAL LIGATION         Family History   Problem Relation Age of Onset    Cancer Mother     Cancer Sister     Cancer Daughter     Cancer Maternal Aunt      I have reviewed and agree with the history as documented  E-Cigarette/Vaping     E-Cigarette/Vaping Substances     Social History     Tobacco Use    Smoking status: Never Smoker    Smokeless tobacco: Never Used   Substance Use Topics    Alcohol use: Not Currently    Drug use: Never       Review of Systems   Constitutional: Negative for chills, fatigue and fever  HENT: Negative for congestion, ear pain, rhinorrhea and sore throat  Eyes: Negative for redness  Respiratory: Negative for chest tightness and shortness of breath  Cardiovascular: Negative for chest pain and palpitations  Gastrointestinal: Negative for abdominal pain, nausea and vomiting  Genitourinary: Negative for dysuria and hematuria  Musculoskeletal: Positive for arthralgias  Skin: Negative for rash  Neurological: Negative for dizziness, syncope, light-headedness and numbness  Physical Exam  Physical Exam  Vitals signs and nursing note reviewed  Constitutional:       Appearance: She is well-developed  HENT:      Head: Normocephalic  Eyes:      General: No scleral icterus  Cardiovascular:      Rate and Rhythm: Normal rate and regular rhythm  Pulmonary:      Effort: Pulmonary effort is normal       Breath sounds: Normal breath sounds  No stridor  Abdominal:      General: There is no distension  Palpations: Abdomen is soft  Tenderness:  There is no abdominal tenderness  Musculoskeletal: Normal range of motion  Comments: Patient normal range of motion of the right hand normal sensation patient denies any pain on palpation  + Finkelstein test    Skin:     General: Skin is warm and dry  Capillary Refill: Capillary refill takes less than 2 seconds  Neurological:      Mental Status: She is alert and oriented to person, place, and time  Vital Signs  ED Triage Vitals [08/27/20 1253]   Temperature Pulse Respirations Blood Pressure SpO2   98 9 °F (37 2 °C) 80 16 148/78 99 %      Temp Source Heart Rate Source Patient Position - Orthostatic VS BP Location FiO2 (%)   Tympanic Monitor Sitting Left arm --      Pain Score       Worst Possible Pain           Vitals:    08/27/20 1253   BP: 148/78   Pulse: 80   Patient Position - Orthostatic VS: Sitting         Visual Acuity      ED Medications  Medications - No data to display    Diagnostic Studies  Results Reviewed     None                 No orders to display              Procedures  Procedures         ED Course       US AUDIT      Most Recent Value   Initial Alcohol Screen: US AUDIT-C    1  How often do you have a drink containing alcohol?  0 Filed at: 08/27/2020 1255   2  How many drinks containing alcohol do you have on a typical day you are drinking? 0 Filed at: 08/27/2020 1255   3a  Male UNDER 65: How often do you have five or more drinks on one occasion? 0 Filed at: 08/27/2020 1255   3b  FEMALE Any Age, or MALE 65+: How often do you have 4 or more drinks on one occassion? 0 Filed at: 08/27/2020 1255   Audit-C Score  0 Filed at: 08/27/2020 1255                  PEDRO PABLO/DAST-10      Most Recent Value   How many times in the past year have you    Used an illegal drug or used a prescription medication for non-medical reasons?   Never Filed at: 08/27/2020 1255                                MDM      Disposition  Final diagnoses:   Right hand pain     Time reflects when diagnosis was documented in both MDM as applicable and the Disposition within this note     Time User Action Codes Description Comment    8/27/2020  1:23 PM Dana Schilling - Kim De Oliveira [[id number]] Right hand pain       ED Disposition     ED Disposition Condition Date/Time Comment    Discharge Good Thu Aug 27, 2020  1:23 PM Giselle Pratherinstein discharge to home/self care  Follow-up Information     Follow up With Specialties Details Why Contact Info    Tori Bryson, [address], Nurse Practitioner Schedule an appointment as soon as possible for a visit in 2 days As needed [id number] [facility] [address]             Discharge Medication List as of 8/27/2020  1:23 PM      START taking these medications    Details   acetaminophen (TYLENOL) 500 mg tablet Take 1 tablet (500 mg total) by mouth every 6 (six) hours as needed for mild pain, Starting Thu 8/27/2020, Normal      naproxen (EC NAPROSYN) 500 MG EC tablet Take 1 tablet (500 mg total) by mouth 2 (two) times a day with meals, Starting Thu 8/27/2020, Until Fri 8/27/2021, Normal         CONTINUE these medications which have NOT CHANGED    Details   albuterol (PROVENTIL HFA,VENTOLIN HFA) 90 mcg/act inhaler Inhale 2 puffs every 6 (six) hours as needed for wheezing or shortness of breath, Starting Tue 2/25/2020, Normal      diphenhydrAMINE (BENADRYL) 25 mg tablet Take 50 mg by mouth daily at bedtime as needed for itching, Historical Med      fexofenadine (ALLEGRA) 180 MG tablet Take 1 tablet (180 mg total) by mouth daily, Starting Tue 2/25/2020, Normal      fluticasone (FLONASE) 50 mcg/act nasal spray 1 spray into each nostril daily, Starting Tue 2/25/2020, Normal           No discharge procedures on file      PDMP Review     None          ED Provider  Electronically Signed by           Aminah Salazar PA-C  08/27/20 7919

## 2020-08-27 NOTE — Clinical Note
Dorotarenny Del Rio was seen and treated in our emergency department on 8/27/2020  Diagnosis:     Salima Melvin  may return to work on return date  She may return on this date: 08/28/2020         If you have any questions or concerns, please don't hesitate to call        Dionicio Sr RN    ______________________________           _______________          _______________  Hospital Representative                              Date                                Time

## 2021-03-05 DIAGNOSIS — J30.9 ALLERGIC RHINITIS, UNSPECIFIED SEASONALITY, UNSPECIFIED TRIGGER: ICD-10-CM

## 2021-03-05 RX ORDER — FLUTICASONE PROPIONATE 50 MCG
1 SPRAY, SUSPENSION (ML) NASAL DAILY
Qty: 1 BOTTLE | Refills: 3 | OUTPATIENT
Start: 2021-03-05

## 2021-08-10 DIAGNOSIS — J30.9 ALLERGIC RHINITIS, UNSPECIFIED SEASONALITY, UNSPECIFIED TRIGGER: ICD-10-CM

## 2021-08-10 RX ORDER — FLUTICASONE PROPIONATE 50 MCG
1 SPRAY, SUSPENSION (ML) NASAL DAILY
OUTPATIENT
Start: 2021-08-10

## 2021-11-17 ENCOUNTER — OFFICE VISIT (OUTPATIENT)
Dept: FAMILY MEDICINE CLINIC | Facility: CLINIC | Age: 41
End: 2021-11-17

## 2021-11-17 VITALS
DIASTOLIC BLOOD PRESSURE: 84 MMHG | WEIGHT: 222 LBS | HEIGHT: 62 IN | SYSTOLIC BLOOD PRESSURE: 122 MMHG | TEMPERATURE: 97.6 F | BODY MASS INDEX: 40.85 KG/M2 | HEART RATE: 86 BPM | RESPIRATION RATE: 18 BRPM | OXYGEN SATURATION: 96 %

## 2021-11-17 DIAGNOSIS — J30.9 ALLERGIC RHINITIS, UNSPECIFIED SEASONALITY, UNSPECIFIED TRIGGER: ICD-10-CM

## 2021-11-17 DIAGNOSIS — Z12.4 CERVICAL CANCER SCREENING: ICD-10-CM

## 2021-11-17 DIAGNOSIS — Z23 NEED FOR VACCINATION: ICD-10-CM

## 2021-11-17 DIAGNOSIS — Z12.31 ENCOUNTER FOR SCREENING MAMMOGRAM FOR MALIGNANT NEOPLASM OF BREAST: ICD-10-CM

## 2021-11-17 DIAGNOSIS — E66.01 CLASS 3 SEVERE OBESITY DUE TO EXCESS CALORIES WITHOUT SERIOUS COMORBIDITY WITH BODY MASS INDEX (BMI) OF 40.0 TO 44.9 IN ADULT (HCC): ICD-10-CM

## 2021-11-17 DIAGNOSIS — M25.552 LEFT HIP PAIN: ICD-10-CM

## 2021-11-17 DIAGNOSIS — Z80.3 FAMILY HISTORY OF BREAST CANCER: ICD-10-CM

## 2021-11-17 DIAGNOSIS — Z28.21 COVID-19 VACCINATION REFUSED: ICD-10-CM

## 2021-11-17 DIAGNOSIS — Z00.00 ANNUAL PHYSICAL EXAM: Primary | ICD-10-CM

## 2021-11-17 DIAGNOSIS — Z11.59 ENCOUNTER FOR HEPATITIS C SCREENING TEST FOR LOW RISK PATIENT: ICD-10-CM

## 2021-11-17 DIAGNOSIS — M79.641 RIGHT HAND PAIN: ICD-10-CM

## 2021-11-17 PROCEDURE — 99396 PREV VISIT EST AGE 40-64: CPT | Performed by: NURSE PRACTITIONER

## 2021-11-17 PROCEDURE — 90471 IMMUNIZATION ADMIN: CPT | Performed by: NURSE PRACTITIONER

## 2021-11-17 PROCEDURE — 90686 IIV4 VACC NO PRSV 0.5 ML IM: CPT | Performed by: NURSE PRACTITIONER

## 2021-11-17 RX ORDER — LORATADINE 10 MG/1
10 TABLET ORAL DAILY
Qty: 90 TABLET | Refills: 3 | Status: SHIPPED | OUTPATIENT
Start: 2021-11-17

## 2021-11-17 RX ORDER — NAPROXEN 500 MG/1
500 TABLET ORAL 2 TIMES DAILY WITH MEALS
Qty: 60 TABLET | Refills: 1 | Status: SHIPPED | OUTPATIENT
Start: 2021-11-17 | End: 2022-01-17

## 2021-11-17 RX ORDER — FLUTICASONE PROPIONATE 50 MCG
1 SPRAY, SUSPENSION (ML) NASAL DAILY
Qty: 18 G | Refills: 5 | Status: SHIPPED | OUTPATIENT
Start: 2021-11-17

## 2021-11-17 RX ORDER — LIDOCAINE 50 MG/G
1 PATCH TOPICAL DAILY
Qty: 90 PATCH | Refills: 1 | Status: SHIPPED | OUTPATIENT
Start: 2021-11-17

## 2021-12-07 ENCOUNTER — APPOINTMENT (OUTPATIENT)
Dept: LAB | Facility: CLINIC | Age: 41
End: 2021-12-07
Payer: COMMERCIAL

## 2021-12-07 DIAGNOSIS — E66.01 CLASS 3 SEVERE OBESITY DUE TO EXCESS CALORIES WITHOUT SERIOUS COMORBIDITY WITH BODY MASS INDEX (BMI) OF 40.0 TO 44.9 IN ADULT (HCC): ICD-10-CM

## 2021-12-07 DIAGNOSIS — Z11.59 ENCOUNTER FOR HEPATITIS C SCREENING TEST FOR LOW RISK PATIENT: ICD-10-CM

## 2021-12-07 LAB
25(OH)D3 SERPL-MCNC: 8.8 NG/ML (ref 30–100)
ALBUMIN SERPL BCP-MCNC: 3.8 G/DL (ref 3.5–5)
ALP SERPL-CCNC: 78 U/L (ref 46–116)
ALT SERPL W P-5'-P-CCNC: 25 U/L (ref 12–78)
ANION GAP SERPL CALCULATED.3IONS-SCNC: 8 MMOL/L (ref 4–13)
AST SERPL W P-5'-P-CCNC: 16 U/L (ref 5–45)
BILIRUB SERPL-MCNC: 0.42 MG/DL (ref 0.2–1)
BUN SERPL-MCNC: 12 MG/DL (ref 5–25)
CALCIUM SERPL-MCNC: 9.6 MG/DL (ref 8.3–10.1)
CHLORIDE SERPL-SCNC: 108 MMOL/L (ref 100–108)
CHOLEST SERPL-MCNC: 185 MG/DL
CO2 SERPL-SCNC: 23 MMOL/L (ref 21–32)
CREAT SERPL-MCNC: 0.75 MG/DL (ref 0.6–1.3)
GFR SERPL CREATININE-BSD FRML MDRD: 99 ML/MIN/1.73SQ M
GLUCOSE P FAST SERPL-MCNC: 83 MG/DL (ref 65–99)
HCV AB SER QL: NORMAL
HDLC SERPL-MCNC: 51 MG/DL
LDLC SERPL CALC-MCNC: 103 MG/DL (ref 0–100)
NONHDLC SERPL-MCNC: 134 MG/DL
POTASSIUM SERPL-SCNC: 4.1 MMOL/L (ref 3.5–5.3)
PROT SERPL-MCNC: 7.6 G/DL (ref 6.4–8.2)
SODIUM SERPL-SCNC: 139 MMOL/L (ref 136–145)
TRIGL SERPL-MCNC: 154 MG/DL
TSH SERPL DL<=0.05 MIU/L-ACNC: 1.75 UIU/ML (ref 0.36–3.74)

## 2021-12-07 PROCEDURE — 86803 HEPATITIS C AB TEST: CPT

## 2021-12-07 PROCEDURE — 83036 HEMOGLOBIN GLYCOSYLATED A1C: CPT

## 2021-12-07 PROCEDURE — 85025 COMPLETE CBC W/AUTO DIFF WBC: CPT

## 2021-12-07 PROCEDURE — 82306 VITAMIN D 25 HYDROXY: CPT

## 2021-12-07 PROCEDURE — 84443 ASSAY THYROID STIM HORMONE: CPT

## 2021-12-07 PROCEDURE — 80061 LIPID PANEL: CPT

## 2021-12-07 PROCEDURE — 80053 COMPREHEN METABOLIC PANEL: CPT

## 2021-12-07 PROCEDURE — 36415 COLL VENOUS BLD VENIPUNCTURE: CPT

## 2021-12-08 LAB
BASOPHILS # BLD AUTO: 0.09 THOUSANDS/ΜL (ref 0–0.1)
BASOPHILS NFR BLD AUTO: 1 % (ref 0–1)
EOSINOPHIL # BLD AUTO: 0.23 THOUSAND/ΜL (ref 0–0.61)
EOSINOPHIL NFR BLD AUTO: 3 % (ref 0–6)
ERYTHROCYTE [DISTWIDTH] IN BLOOD BY AUTOMATED COUNT: 14.1 % (ref 11.6–15.1)
EST. AVERAGE GLUCOSE BLD GHB EST-MCNC: 108 MG/DL
HBA1C MFR BLD: 5.4 %
HCT VFR BLD AUTO: 44.8 % (ref 34.8–46.1)
HGB BLD-MCNC: 13.7 G/DL (ref 11.5–15.4)
IMM GRANULOCYTES # BLD AUTO: 0.02 THOUSAND/UL (ref 0–0.2)
IMM GRANULOCYTES NFR BLD AUTO: 0 % (ref 0–2)
LYMPHOCYTES # BLD AUTO: 2.19 THOUSANDS/ΜL (ref 0.6–4.47)
LYMPHOCYTES NFR BLD AUTO: 25 % (ref 14–44)
MCH RBC QN AUTO: 29.7 PG (ref 26.8–34.3)
MCHC RBC AUTO-ENTMCNC: 30.6 G/DL (ref 31.4–37.4)
MCV RBC AUTO: 97 FL (ref 82–98)
MONOCYTES # BLD AUTO: 0.65 THOUSAND/ΜL (ref 0.17–1.22)
MONOCYTES NFR BLD AUTO: 7 % (ref 4–12)
NEUTROPHILS # BLD AUTO: 5.74 THOUSANDS/ΜL (ref 1.85–7.62)
NEUTS SEG NFR BLD AUTO: 64 % (ref 43–75)
NRBC BLD AUTO-RTO: 0 /100 WBCS
PLATELET # BLD AUTO: 431 THOUSANDS/UL (ref 149–390)
PMV BLD AUTO: 11.8 FL (ref 8.9–12.7)
RBC # BLD AUTO: 4.62 MILLION/UL (ref 3.81–5.12)
WBC # BLD AUTO: 8.92 THOUSAND/UL (ref 4.31–10.16)

## 2022-01-17 DIAGNOSIS — M79.641 RIGHT HAND PAIN: ICD-10-CM

## 2022-01-17 RX ORDER — NAPROXEN 500 MG/1
TABLET ORAL
Qty: 60 TABLET | Refills: 1 | Status: SHIPPED | OUTPATIENT
Start: 2022-01-17

## 2022-02-10 ENCOUNTER — TELEPHONE (OUTPATIENT)
Dept: OBGYN CLINIC | Facility: CLINIC | Age: 42
End: 2022-02-10

## 2022-04-07 ENCOUNTER — TELEPHONE (OUTPATIENT)
Dept: FAMILY MEDICINE CLINIC | Facility: CLINIC | Age: 42
End: 2022-04-07

## 2022-05-13 ENCOUNTER — HOSPITAL ENCOUNTER (EMERGENCY)
Facility: HOSPITAL | Age: 42
Discharge: HOME | End: 2022-05-13
Attending: EMERGENCY MEDICINE
Payer: MEDICARE

## 2022-05-13 VITALS
RESPIRATION RATE: 18 BRPM | WEIGHT: 218.26 LBS | TEMPERATURE: 98.3 F | DIASTOLIC BLOOD PRESSURE: 89 MMHG | BODY MASS INDEX: 39.92 KG/M2 | HEART RATE: 83 BPM | SYSTOLIC BLOOD PRESSURE: 140 MMHG | OXYGEN SATURATION: 98 %

## 2022-05-13 DIAGNOSIS — H65.93 MEE (MIDDLE EAR EFFUSION), BILATERAL: Primary | ICD-10-CM

## 2022-05-13 PROCEDURE — 99282 EMERGENCY DEPT VISIT SF MDM: CPT | Performed by: PHYSICIAN ASSISTANT

## 2022-05-13 PROCEDURE — 99283 EMERGENCY DEPT VISIT LOW MDM: CPT

## 2022-05-13 RX ORDER — LORATADINE AND PSEUDOEPHEDRINE 10; 240 MG/1; MG/1
1 TABLET, EXTENDED RELEASE ORAL DAILY
Qty: 10 TABLET | Refills: 0 | Status: SHIPPED | OUTPATIENT
Start: 2022-05-13

## 2022-05-13 RX ORDER — FLUTICASONE PROPIONATE 50 MCG
1 SPRAY, SUSPENSION (ML) NASAL DAILY
Qty: 16 G | Refills: 0 | Status: SHIPPED | OUTPATIENT
Start: 2022-05-13

## 2022-05-13 NOTE — ED PROVIDER NOTES
History  Chief Complaint   Patient presents with    Dizziness     Pt arrives c/o " balance is off ", dizziness " feels like water in right ear " since yesterday      Patient 78-year-old female past medical history significant for vertigo presenting today for evaluation of bilateral ear pressure  Patient reports right ear pressure worse than left  Patient denies any pain, discharge, fevers, chills, headaches or mastoid tenderness  Patient does report she feels as though there is fluid in the ear  Patient denies any direct traumatic inciting events and reports no sore throat, coughing, chest pain, shortness of breath, abdominal pain, nausea or vomiting  Patient reports she has had some congestion and sinus pressure  Patient reports she has been taking Benadryl with no relief for her symptoms  History provided by:  Patient   used: No        Prior to Admission Medications   Prescriptions Last Dose Informant Patient Reported?  Taking?   acetaminophen (TYLENOL) 500 mg tablet   No No   Sig: Take 1 tablet (500 mg total) by mouth every 6 (six) hours as needed for mild pain   diphenhydrAMINE (BENADRYL) 25 mg tablet   Yes No   Sig: Take 50 mg by mouth daily at bedtime as needed for itching   ergocalciferol (VITAMIN D2) 50,000 units   No No   Sig: Take 1 capsule (50,000 Units total) by mouth once a week   fluticasone (FLONASE) 50 mcg/act nasal spray   No No   Si spray into each nostril daily   lidocaine (Lidoderm) 5 %   No No   Sig: Apply 1 patch topically daily Remove & Discard patch within 12 hours or as directed by MD   loratadine (CLARITIN) 10 mg tablet   No No   Sig: Take 1 tablet (10 mg total) by mouth daily   naproxen (EC NAPROSYN) 500 MG EC tablet   No No   Sig: TAKE 1 TABLET(500 MG) BY MOUTH TWICE DAILY WITH MEALS      Facility-Administered Medications: None       Past Medical History:   Diagnosis Date    Vertigo        Past Surgical History:   Procedure Laterality Date    ABDOMINAL SURGERY       SECTION      CHOLECYSTECTOMY      TUBAL LIGATION         Family History   Problem Relation Age of Onset    Cancer Mother     Cancer Sister     Cancer Daughter     Cancer Maternal Aunt      I have reviewed and agree with the history as documented  E-Cigarette/Vaping     E-Cigarette/Vaping Substances     Social History     Tobacco Use    Smoking status: Never Smoker    Smokeless tobacco: Never Used   Substance Use Topics    Alcohol use: Not Currently    Drug use: Never       Review of Systems   Constitutional: Negative for chills, fatigue and fever  HENT: Positive for ear pain and sinus pressure  Negative for congestion, rhinorrhea and sore throat  Eyes: Negative for redness  Respiratory: Negative for chest tightness and shortness of breath  Cardiovascular: Negative for chest pain and palpitations  Gastrointestinal: Negative for abdominal pain, nausea and vomiting  Genitourinary: Negative for dysuria and hematuria  Musculoskeletal: Negative  Skin: Negative for rash  Neurological: Negative for dizziness, syncope, light-headedness and numbness  Physical Exam  Physical Exam  Vitals and nursing note reviewed  Constitutional:       Appearance: She is well-developed  HENT:      Head: Normocephalic  Right Ear: A middle ear effusion is present  Left Ear: A middle ear effusion is present  Eyes:      General: No scleral icterus  Cardiovascular:      Rate and Rhythm: Normal rate and regular rhythm  Pulmonary:      Effort: Pulmonary effort is normal       Breath sounds: Normal breath sounds  No stridor  Abdominal:      General: There is no distension  Palpations: Abdomen is soft  Tenderness: There is no abdominal tenderness  Musculoskeletal:         General: Normal range of motion  Skin:     General: Skin is warm and dry  Capillary Refill: Capillary refill takes less than 2 seconds     Neurological:      Mental Status: She is alert and oriented to person, place, and time  Vital Signs  ED Triage Vitals [05/13/22 0835]   Temperature Pulse Respirations Blood Pressure SpO2   98 3 °F (36 8 °C) 81 18 (!) 207/89 98 %      Temp Source Heart Rate Source Patient Position - Orthostatic VS BP Location FiO2 (%)   Oral Monitor Sitting Left arm --      Pain Score       --           Vitals:    05/13/22 0835 05/13/22 0849   BP: (!) 207/89 140/89   Pulse: 81 83   Patient Position - Orthostatic VS: Sitting Sitting         Visual Acuity      ED Medications  Medications - No data to display    Diagnostic Studies  Results Reviewed     None                 No orders to display              Procedures  Procedures         ED Course                                             MDM  Number of Diagnoses or Management Options  TINO (middle ear effusion), bilateral  Diagnosis management comments: Strict return to ED precautions discussed  Patient recommended to follow up promptly with appropriate outpatient provider  Patient and/or family members verbalizes understanding and agrees with plan  Patient is stable for discharge      Portions of the record may have been created with voice recognition software  Occasional wrong word or "sound a like" substitutions may have occurred due to the inherent limitations of voice recognition software  Read the chart carefully and recognize, using context, where substitutions have occurred  Disposition  Final diagnoses:   TINO (middle ear effusion), bilateral     Time reflects when diagnosis was documented in both MDM as applicable and the Disposition within this note     Time User Action Codes Description Comment    5/13/2022  8:42 AM Sree Stephens Add [H65 [id number]] TINO (middle ear effusion), bilateral       ED Disposition     ED Disposition   Discharge    Condition   Good    Date/Time   Fri May 13, 2022  8:42 AM    Comment   Carol Darden discharge to home/self care                 Follow-up Information Follow up With Specialties Details Why Contact Info Additional [address] [facility] Otol[facility]ology Schedule an appointment as soon as possible for a visit  As needed [address] [facility] [address]  [address] [facility], [address] [place] [address], [id number] [phone]          Patient's Medications   Discharge Prescriptions    FLUTICASONE (FLONASE) 50 MCG/ACT NASAL SPRAY    1 spray into each nostril in the morning  Start Date: 5/13/2022 End Date: --       Order Dose: 1 spray       Quantity: 16 g    Refills: 0    LORATADINE-PSEUDOEPHEDRINE (CLARITIN-D 24-HOUR)  MG PER 24 HR TABLET    Take 1 tablet by mouth in the morning  Start Date: 5/13/2022 End Date: --       Order Dose: 1 tablet       Quantity: 10 tablet    Refills: 0       No discharge procedures on file      PDMP Review     None          ED Provider  Electronically Signed by           Zane King PA-C  05/13/22 1877

## 2022-05-13 NOTE — Clinical Note
Quoc Peguero was seen and treated in our emergency department on 5/13/2022  Diagnosis:     Charity John  may return to work on return date  She may return on this date: 05/14/2022         If you have any questions or concerns, please don't hesitate to call        Cheryle Smolder, PA-C    ______________________________           _______________          _______________  Hospital Representative                              Date                                Time

## 2023-02-28 DIAGNOSIS — J30.9 ALLERGIC RHINITIS, UNSPECIFIED SEASONALITY, UNSPECIFIED TRIGGER: ICD-10-CM

## 2023-02-28 NOTE — TELEPHONE ENCOUNTER
Pt needs refill    fluticasone (FLONASE) 50 mcg/act nasal spray    loratadine (CLARITIN) 10 mg tablet

## 2023-03-02 RX ORDER — FLUTICASONE PROPIONATE 50 MCG
1 SPRAY, SUSPENSION (ML) NASAL DAILY
Qty: 18 G | Refills: 5 | OUTPATIENT
Start: 2023-03-02

## 2023-03-02 RX ORDER — LORATADINE 10 MG/1
10 TABLET ORAL DAILY
Qty: 90 TABLET | Refills: 3 | OUTPATIENT
Start: 2023-03-02

## 2023-03-08 ENCOUNTER — TELEPHONE (OUTPATIENT)
Dept: FAMILY MEDICINE CLINIC | Facility: CLINIC | Age: 43
End: 2023-03-08

## 2023-03-15 ENCOUNTER — HOSPITAL ENCOUNTER (EMERGENCY)
Facility: HOSPITAL | Age: 43
Discharge: HOME | End: 2023-03-15
Attending: EMERGENCY MEDICINE | Admitting: EMERGENCY MEDICINE

## 2023-03-15 VITALS
TEMPERATURE: 98.5 F | HEART RATE: 106 BPM | RESPIRATION RATE: 18 BRPM | BODY MASS INDEX: 37.13 KG/M2 | OXYGEN SATURATION: 100 % | DIASTOLIC BLOOD PRESSURE: 77 MMHG | WEIGHT: 203 LBS | SYSTOLIC BLOOD PRESSURE: 140 MMHG

## 2023-03-15 DIAGNOSIS — J06.9 URI (UPPER RESPIRATORY INFECTION): Primary | ICD-10-CM

## 2023-03-15 RX ORDER — FLUTICASONE PROPIONATE 50 MCG
1 SPRAY, SUSPENSION (ML) NASAL DAILY
Status: DISCONTINUED | OUTPATIENT
Start: 2023-03-16 | End: 2023-03-15 | Stop reason: HOSPADM

## 2023-03-15 RX ORDER — ACETAMINOPHEN 500 MG
500 TABLET ORAL EVERY 6 HOURS PRN
Qty: 30 TABLET | Refills: 0 | Status: SHIPPED | OUTPATIENT
Start: 2023-03-15

## 2023-03-15 RX ORDER — KETOROLAC TROMETHAMINE 30 MG/ML
15 INJECTION, SOLUTION INTRAMUSCULAR; INTRAVENOUS ONCE
Status: COMPLETED | OUTPATIENT
Start: 2023-03-15 | End: 2023-03-15

## 2023-03-15 RX ORDER — ACETAMINOPHEN 325 MG/1
650 TABLET ORAL ONCE
Status: COMPLETED | OUTPATIENT
Start: 2023-03-15 | End: 2023-03-15

## 2023-03-15 RX ORDER — PSEUDOEPHEDRINE HCL 120 MG/1
120 TABLET, FILM COATED, EXTENDED RELEASE ORAL 2 TIMES DAILY
Qty: 20 TABLET | Refills: 0 | Status: SHIPPED | OUTPATIENT
Start: 2023-03-15

## 2023-03-15 RX ORDER — FLUTICASONE PROPIONATE 50 MCG
1 SPRAY, SUSPENSION (ML) NASAL DAILY
Qty: 16 G | Refills: 0 | Status: SHIPPED | OUTPATIENT
Start: 2023-03-15

## 2023-03-15 RX ADMIN — ACETAMINOPHEN 650 MG: 325 TABLET ORAL at 21:10

## 2023-03-15 RX ADMIN — KETOROLAC TROMETHAMINE 15 MG: 30 INJECTION, SOLUTION INTRAMUSCULAR; INTRAVENOUS at 21:10

## 2023-03-15 NOTE — Clinical Note
Emmanuel Jones was seen and treated in our emergency department on 3/15/2023  Diagnosis:     Saran Mcpherson  may return to work on return date  She may return on this date: 03/17/2023         If you have any questions or concerns, please don't hesitate to call        Rony Ordonez PA-C    ______________________________           _______________          _______________  Hospital Representative                              Date                                Time

## 2023-03-16 NOTE — ED PROVIDER NOTES
History  Chief Complaint   Patient presents with   • Cold Like Symptoms     Patient reports headache, nasal congestion, chills, and bodyaches since   Today she just left work because she is feeling ill  No meds taken  Not vaccinated for covid  The patient is a 38 YOF presenting for 3 day history of congestion, chills, and myalgias  She is also experiencing a headache to her entire head which began today described as a tension  She has not taken any medication for symptoms  Headache was not acute or maximal in onset  She denies neck pain/stiffness, fever, vision changes, numbness, paresthesias, focal weakness, chest pain, dyspnea, abdominal pain, nausea/vomiting  Prior to Admission Medications   Prescriptions Last Dose Informant Patient Reported? Taking?   ergocalciferol (VITAMIN D2) 50,000 units   No No   Sig: Take 1 capsule (50,000 Units total) by mouth once a week   fluticasone (FLONASE) 50 mcg/act nasal spray   No No   Si spray into each nostril daily   lidocaine (Lidoderm) 5 %   No No   Sig: Apply 1 patch topically daily Remove & Discard patch within 12 hours or as directed by MD   loratadine (CLARITIN) 10 mg tablet   No No   Sig: Take 1 tablet (10 mg total) by mouth daily      Facility-Administered Medications: None       Past Medical History:   Diagnosis Date   • Vertigo        Past Surgical History:   Procedure Laterality Date   • ABDOMINAL SURGERY     •  SECTION     • CHOLECYSTECTOMY     • TUBAL LIGATION         Family History   Problem Relation Age of Onset   • Cancer Mother    • Cancer Sister    • Cancer Daughter    • Cancer Maternal Aunt      I have reviewed and agree with the history as documented      E-Cigarette/Vaping   • E-Cigarette Use Never User      E-Cigarette/Vaping Substances     Social History     Tobacco Use   • Smoking status: Never   • Smokeless tobacco: Never   Vaping Use   • Vaping Use: Never used   Substance Use Topics   • Alcohol use: Not Currently   • Drug use: Never       Review of Systems   Constitutional: Negative for chills and fever  HENT: Positive for congestion, rhinorrhea and sinus pressure  Negative for sore throat and trouble swallowing  Respiratory: Positive for cough  Negative for shortness of breath  Cardiovascular: Negative for chest pain and leg swelling  Gastrointestinal: Negative for abdominal pain, constipation, diarrhea, nausea and vomiting  Genitourinary: Negative for dysuria and flank pain  Musculoskeletal: Positive for myalgias  Negative for arthralgias  Skin: Negative for rash and wound  Neurological: Positive for headaches  Negative for dizziness, weakness and numbness  Psychiatric/Behavioral: Negative for behavioral problems  Physical Exam  Physical Exam  Vitals and nursing note reviewed  Constitutional:       General: She is not in acute distress  Appearance: She is well-developed  HENT:      Head: Normocephalic and atraumatic  Nose: Congestion and rhinorrhea present  Mouth/Throat:      Mouth: Mucous membranes are moist  No angioedema  Pharynx: Oropharynx is clear  Uvula midline  No oropharyngeal exudate, posterior oropharyngeal erythema or uvula swelling  Comments: Normal phonation  Tolerating oral secretions  Eyes:      Conjunctiva/sclera: Conjunctivae normal    Cardiovascular:      Rate and Rhythm: Normal rate and regular rhythm  Heart sounds: No murmur heard  Pulmonary:      Effort: Pulmonary effort is normal  No respiratory distress  Breath sounds: Normal breath sounds  Abdominal:      Palpations: Abdomen is soft  Tenderness: There is no abdominal tenderness  Musculoskeletal:         General: No swelling  Cervical back: Full passive range of motion without pain, normal range of motion and neck supple  No rigidity  Normal range of motion  Skin:     General: Skin is warm and dry  Capillary Refill: Capillary refill takes less than 2 seconds  Findings: No rash  Neurological:      Mental Status: She is alert  Psychiatric:         Mood and Affect: Mood normal          Vital Signs  ED Triage Vitals   Temperature Pulse Respirations Blood Pressure SpO2   03/15/23 2021 03/15/23 2021 03/15/23 2021 03/15/23 2021 03/15/23 2021   98 5 °F (36 9 °C) (!) 106 18 140/77 100 %      Temp Source Heart Rate Source Patient Position - Orthostatic VS BP Location FiO2 (%)   03/15/23 2021 03/15/23 2021 03/15/23 2021 03/15/23 2021 --   Tympanic Monitor Sitting Left arm       Pain Score       03/15/23 2110       7           Vitals:    03/15/23 2021   BP: 140/77   Pulse: (!) 106   Patient Position - Orthostatic VS: Sitting         Visual Acuity      ED Medications  Medications   acetaminophen (TYLENOL) tablet 650 mg (650 mg Oral Given 3/15/23 2110)   ketorolac (TORADOL) injection 15 mg (15 mg Intramuscular Given 3/15/23 2110)       Diagnostic Studies  Results Reviewed     None                 No orders to display              Procedures  Procedures         ED Course     Medical Decision Making  On exam, the patient is well-appearing in no acute distress  No dyspnea, tachypnea, cyanosis, or any other respiratory distress  Patient is well hydrated with moist mucous membranes  Normal phonation  Tolerating oral secretions  Vital signs stable  No neck stiffness  Ddx sinusitis, COVID, influenza, rhinovirus, other viral syndrome    COVID19 and Flu testing has not been performed as patient is not interested  At the time of discharge, the patient is in no acute distress  I discussed with the patient the diagnosis, treatment plan, follow-up, return precautions, and discharge instructions; they were given the opportunity to ask questions and verbalized understanding  URI (upper respiratory infection): acute illness or injury  Amount and/or Complexity of Data Reviewed  External Data Reviewed: notes  Risk  OTC drugs    Prescription drug management  Disposition  Final diagnoses:   URI (upper respiratory infection)     Time reflects when diagnosis was documented in both MDM as applicable and the Disposition within this note     Time User Action Codes Description Comment    3/15/2023  8:53 PM Jeanine Tello Add [J06 9] URI (upper respiratory infection)       ED Disposition     ED Disposition   Discharge    Condition   Stable    Date/Time   Wed Mar 15, 2023  8:53 PM    [address] discharge to home/self care  Follow-up Information     Follow up With Specialties Details Why [address], [address] [facility], Nurse Practitioner   [address]  [address] [facility] [address]             Discharge Medication List as of 3/15/2023  8:55 PM      START taking these medications    Details   acetaminophen (TYLENOL) 500 mg tablet Take 1 tablet (500 mg total) by mouth every 6 (six) hours as needed for mild pain, Starting Wed 3/15/2023, Normal      !! fluticasone (FLONASE) 50 mcg/act nasal spray 1 spray into each nostril daily, Starting Wed 3/15/2023, Normal      pseudoephedrine (SUDAFED) 120 MG 12 hr tablet Take 1 tablet (120 mg total) by mouth 2 (two) times a day, Starting Wed 3/15/2023, Normal       !! - Potential duplicate medications found  Please discuss with provider        CONTINUE these medications which have NOT CHANGED    Details   ergocalciferol (VITAMIN D2) 50,000 units Take 1 capsule (50,000 Units total) by mouth once a week, Starting Wed 12/8/2021, Normal      !! fluticasone (FLONASE) 50 mcg/act nasal spray 1 spray into each nostril daily, Starting Wed 11/17/2021, Normal      lidocaine (Lidoderm) 5 % Apply 1 patch topically daily Remove & Discard patch within 12 hours or as directed by MD, Starting Wed 11/17/2021, Normal      loratadine (CLARITIN) 10 mg tablet Take 1 tablet (10 mg total) by mouth daily, Starting Wed 11/17/2021, Normal       !! - Potential duplicate medications found  Please discuss with provider  No discharge procedures on file      PDMP Review     None          ED Provider  Electronically Signed by           Rosalie Campos PA-C  03/16/23 4520

## 2023-03-16 NOTE — DISCHARGE INSTRUCTIONS
Return for chest pain, trouble breathing, leg swelling, coughing up blood, neck stiffness, vision changes, or any other new/concerning symptoms     Take Sudafed as prescribed for congestion    Use Flonase as prescribed for congestion    Take Tylenol as prescribed as needed for headache

## 2023-06-06 ENCOUNTER — HOSPITAL ENCOUNTER (EMERGENCY)
Facility: HOSPITAL | Age: 43
Discharge: HOME | End: 2023-06-06
Attending: EMERGENCY MEDICINE | Admitting: EMERGENCY MEDICINE
Payer: MEDICARE

## 2023-06-06 VITALS
TEMPERATURE: 97.7 F | HEART RATE: 92 BPM | BODY MASS INDEX: 37.59 KG/M2 | SYSTOLIC BLOOD PRESSURE: 118 MMHG | WEIGHT: 205.51 LBS | RESPIRATION RATE: 18 BRPM | DIASTOLIC BLOOD PRESSURE: 79 MMHG | OXYGEN SATURATION: 100 %

## 2023-06-06 DIAGNOSIS — K64.8 PROLAPSED INTERNAL HEMORRHOIDS: Primary | ICD-10-CM

## 2023-06-06 LAB
ALBUMIN SERPL BCP-MCNC: 4.2 G/DL (ref 3.5–5)
ALP SERPL-CCNC: 59 U/L (ref 34–104)
ALT SERPL W P-5'-P-CCNC: 12 U/L (ref 7–52)
ANION GAP SERPL CALCULATED.3IONS-SCNC: 6 MMOL/L (ref 4–13)
AST SERPL W P-5'-P-CCNC: 15 U/L (ref 13–39)
BASOPHILS # BLD AUTO: 0.08 THOUSANDS/ÂΜL (ref 0–0.1)
BASOPHILS NFR BLD AUTO: 1 % (ref 0–1)
BILIRUB SERPL-MCNC: 0.54 MG/DL (ref 0.2–1)
BUN SERPL-MCNC: 13 MG/DL (ref 5–25)
CALCIUM SERPL-MCNC: 9.2 MG/DL (ref 8.4–10.2)
CHLORIDE SERPL-SCNC: 106 MMOL/L (ref 96–108)
CO2 SERPL-SCNC: 29 MMOL/L (ref 21–32)
CREAT SERPL-MCNC: 0.88 MG/DL (ref 0.6–1.3)
EOSINOPHIL # BLD AUTO: 0.19 THOUSAND/ÂΜL (ref 0–0.61)
EOSINOPHIL NFR BLD AUTO: 2 % (ref 0–6)
ERYTHROCYTE [DISTWIDTH] IN BLOOD BY AUTOMATED COUNT: 13.5 % (ref 11.6–15.1)
GFR SERPL CREATININE-BSD FRML MDRD: 80 ML/MIN/1.73SQ M
GLUCOSE SERPL-MCNC: 92 MG/DL (ref 65–140)
HCT VFR BLD AUTO: 42 % (ref 34.8–46.1)
HGB BLD-MCNC: 13.3 G/DL (ref 11.5–15.4)
IMM GRANULOCYTES # BLD AUTO: 0.02 THOUSAND/UL (ref 0–0.2)
IMM GRANULOCYTES NFR BLD AUTO: 0 % (ref 0–2)
LYMPHOCYTES # BLD AUTO: 2.1 THOUSANDS/ÂΜL (ref 0.6–4.47)
LYMPHOCYTES NFR BLD AUTO: 27 % (ref 14–44)
MCH RBC QN AUTO: 29 PG (ref 26.8–34.3)
MCHC RBC AUTO-ENTMCNC: 31.7 G/DL (ref 31.4–37.4)
MCV RBC AUTO: 92 FL (ref 82–98)
MONOCYTES # BLD AUTO: 0.6 THOUSAND/ÂΜL (ref 0.17–1.22)
MONOCYTES NFR BLD AUTO: 8 % (ref 4–12)
NEUTROPHILS # BLD AUTO: 4.92 THOUSANDS/ÂΜL (ref 1.85–7.62)
NEUTS SEG NFR BLD AUTO: 62 % (ref 43–75)
NRBC BLD AUTO-RTO: 0 /100 WBCS
PLATELET # BLD AUTO: 427 THOUSANDS/UL (ref 149–390)
PMV BLD AUTO: 10.2 FL (ref 8.9–12.7)
POTASSIUM SERPL-SCNC: 3.9 MMOL/L (ref 3.5–5.3)
PROT SERPL-MCNC: 7.2 G/DL (ref 6.4–8.4)
RBC # BLD AUTO: 4.58 MILLION/UL (ref 3.81–5.12)
SODIUM SERPL-SCNC: 141 MMOL/L (ref 135–147)
WBC # BLD AUTO: 7.91 THOUSAND/UL (ref 4.31–10.16)

## 2023-06-06 PROCEDURE — 80053 COMPREHEN METABOLIC PANEL: CPT | Performed by: EMERGENCY MEDICINE

## 2023-06-06 PROCEDURE — 85025 COMPLETE CBC W/AUTO DIFF WBC: CPT | Performed by: EMERGENCY MEDICINE

## 2023-06-06 PROCEDURE — 36415 COLL VENOUS BLD VENIPUNCTURE: CPT | Performed by: EMERGENCY MEDICINE

## 2023-06-06 RX ORDER — DOCUSATE SODIUM 100 MG/1
100 CAPSULE, LIQUID FILLED ORAL EVERY 12 HOURS
Qty: 60 CAPSULE | Refills: 0 | Status: SHIPPED | OUTPATIENT
Start: 2023-06-06

## 2023-06-06 RX ORDER — LIDOCAINE HYDROCHLORIDE 20 MG/ML
JELLY TOPICAL
Status: COMPLETED
Start: 2023-06-06 | End: 2023-06-06

## 2023-06-06 RX ORDER — OXYCODONE HYDROCHLORIDE AND ACETAMINOPHEN 5; 325 MG/1; MG/1
1 TABLET ORAL ONCE
Status: COMPLETED | OUTPATIENT
Start: 2023-06-06 | End: 2023-06-06

## 2023-06-06 RX ORDER — LIDOCAINE HYDROCHLORIDE 20 MG/ML
JELLY TOPICAL ONCE
Status: COMPLETED | OUTPATIENT
Start: 2023-06-06 | End: 2023-06-06

## 2023-06-06 RX ORDER — POLYETHYLENE GLYCOL 3350 17 G/17G
17 POWDER, FOR SOLUTION ORAL 2 TIMES DAILY
Qty: 238 G | Refills: 0 | Status: SHIPPED | OUTPATIENT
Start: 2023-06-06 | End: 2023-06-13

## 2023-06-06 RX ORDER — NAPROXEN 375 MG/1
375 TABLET ORAL 2 TIMES DAILY WITH MEALS
Qty: 20 TABLET | Refills: 0 | Status: SHIPPED | OUTPATIENT
Start: 2023-06-06

## 2023-06-06 RX ADMIN — OXYCODONE HYDROCHLORIDE AND ACETAMINOPHEN 1 TABLET: 5; 325 TABLET ORAL at 14:53

## 2023-06-06 RX ADMIN — LIDOCAINE HYDROCHLORIDE 5 APPLICATION.: 20 JELLY TOPICAL at 15:04

## 2023-06-06 NOTE — ED PROVIDER NOTES
History  Chief Complaint   Patient presents with   • Hemorrhoids     She felt them pop when she tried to have a BM over the weekend  76-year-old female presenting to the emergency department with rectal pain  Patient notes that she was pushing for 5 hours today because she had feeling of incomplete defecation and all of a sudden felt a pop and had some bleeding  She had diarrhea until day before yesterday when she took much Imodium  No bowel movement since then  No fever chills cough congestion rhinorrhea  No chest pain or shortness of breath  Prior to Admission Medications   Prescriptions Last Dose Informant Patient Reported? Taking?   acetaminophen (TYLENOL) 500 mg tablet   No No   Sig: Take 1 tablet (500 mg total) by mouth every 6 (six) hours as needed for mild pain   ergocalciferol (VITAMIN D2) 50,000 units   No No   Sig: Take 1 capsule (50,000 Units total) by mouth once a week   fluticasone (FLONASE) 50 mcg/act nasal spray   No No   Si spray into each nostril daily   fluticasone (FLONASE) 50 mcg/act nasal spray   No No   Si spray into each nostril daily   lidocaine (Lidoderm) 5 %   No No   Sig: Apply 1 patch topically daily Remove & Discard patch within 12 hours or as directed by MD   loratadine (CLARITIN) 10 mg tablet   No No   Sig: Take 1 tablet (10 mg total) by mouth daily   pseudoephedrine (SUDAFED) 120 MG 12 hr tablet   No No   Sig: Take 1 tablet (120 mg total) by mouth 2 (two) times a day      Facility-Administered Medications: None       Past Medical History:   Diagnosis Date   • Vertigo        Past Surgical History:   Procedure Laterality Date   • ABDOMINAL SURGERY     •  SECTION     • CHOLECYSTECTOMY     • TUBAL LIGATION         Family History   Problem Relation Age of Onset   • Cancer Mother    • Cancer Sister    • Cancer Daughter    • Cancer Maternal Aunt      I have reviewed and agree with the history as documented      E-Cigarette/Vaping   • E-Cigarette Use Never User      E-Cigarette/Vaping Substances     Social History     Tobacco Use   • Smoking status: Never   • Smokeless tobacco: Never   Vaping Use   • Vaping Use: Never used   Substance Use Topics   • Alcohol use: Not Currently   • Drug use: Never       Review of Systems   Constitutional: Negative for chills and fever  HENT: Negative for ear pain and sore throat  Eyes: Negative for pain and visual disturbance  Respiratory: Negative for cough and shortness of breath  Cardiovascular: Negative for chest pain and palpitations  Gastrointestinal: Positive for anal bleeding, constipation and rectal pain  Negative for abdominal pain and vomiting  Genitourinary: Negative for dysuria and hematuria  Musculoskeletal: Negative for arthralgias and back pain  Skin: Negative for color change and rash  Neurological: Negative for seizures and syncope  All other systems reviewed and are negative  Physical Exam  Physical Exam  Vitals and nursing note reviewed  Constitutional:       General: She is not in acute distress  Appearance: She is well-developed  HENT:      Head: Normocephalic and atraumatic  Nose: Nose normal       Mouth/Throat:      Mouth: Mucous membranes are moist    Eyes:      Conjunctiva/sclera: Conjunctivae normal    Cardiovascular:      Rate and Rhythm: Normal rate and regular rhythm  Heart sounds: No murmur heard  Pulmonary:      Effort: Pulmonary effort is normal  No respiratory distress  Breath sounds: Normal breath sounds  Abdominal:      Palpations: Abdomen is soft  Tenderness: There is no abdominal tenderness  Genitourinary:     Comments: Rectal exam performed in the presence of nurse Jessi Noel, prolapsed internal hemorrhoids with mucosal purulence  Musculoskeletal:         General: No swelling  Cervical back: Neck supple  Skin:     General: Skin is warm and dry  Capillary Refill: Capillary refill takes less than 2 seconds     Neurological: Mental Status: She is alert  Psychiatric:         Mood and Affect: Mood normal          Vital Signs  ED Triage Vitals   Temperature Pulse Respirations Blood Pressure SpO2   06/06/23 1401 06/06/23 1401 06/06/23 1401 06/06/23 1401 06/06/23 1401   97 7 °F (36 5 °C) 92 18 118/79 100 %      Temp Source Heart Rate Source Patient Position - Orthostatic VS BP Location FiO2 (%)   06/06/23 1401 06/06/23 1401 06/06/23 1401 06/06/23 1401 --   Tympanic Monitor Sitting Left arm       Pain Score       06/06/23 1453       10 - Worst Possible Pain           Vitals:    06/06/23 1401   BP: 118/79   Pulse: 92   Patient Position - Orthostatic VS: Sitting         Visual Acuity      ED Medications  Medications   oxyCODONE-acetaminophen (PERCOCET) 5-325 mg per tablet 1 tablet (1 tablet Oral Given 6/6/23 1453)   lidocaine (URO-JET) 2 % urethral/mucosal gel (5 application   Topical Given 6/6/23 1504)       Diagnostic Studies  Results Reviewed     Procedure Component Value Units Date/Time    Comprehensive metabolic panel [[id number]] Collected: 06/06/23 1446    Lab Status: Final result Specimen: Blood from Arm, Left Updated: 06/06/23 1508     Sodium 141 mmol/L      Potassium 3 9 mmol/L      Chloride 106 mmol/L      CO2 29 mmol/L      ANION GAP 6 mmol/L      BUN 13 mg/dL      Creatinine 0 88 mg/dL      Glucose 92 mg/dL      Calcium 9 2 mg/dL      AST 15 U/L      ALT 12 U/L      Alkaline Phosphatase 59 U/L      Total Protein 7 2 g/dL      Albumin 4 2 g/dL      Total Bilirubin 0 54 mg/dL      eGFR 80 ml/min/1 73sq m     Narrative:      [facility] guidelines for Chronic Kidney Disease (CKD):   •  Stage 1 with normal or high GFR (GFR > 90 mL/min/1 73 square meters)  •  Stage 2 Mild CKD (GFR = 60-89 mL/min/1 73 square meters)  •  Stage 3A Moderate CKD (GFR = 45-59 mL/min/1 73 square meters)  •  Stage 3B Moderate CKD (GFR = 30-44 mL/min/1 73 square meters)  •  Stage 4 Severe CKD (GFR = 15-29 mL/min/1 73 square meters)  • Stage 5 End Stage CKD (GFR <15 mL/min/1 73 square meters)  Note: GFR calculation is accurate only with a steady state creatinine    CBC and differential [[id number]]  (Abnormal) Collected: 06/06/23 1446    Lab Status: Final result Specimen: Blood from Arm, Left Updated: 06/06/23 1452     WBC 7 91 Thousand/uL      RBC 4 58 Million/uL      Hemoglobin 13 3 g/dL      Hematocrit 42 0 %      MCV 92 fL      MCH 29 0 pg      MCHC 31 7 g/dL      RDW 13 5 %      MPV 10 2 fL      Platelets 147 Thousands/uL      nRBC 0 /100 WBCs      Neutrophils Relative 62 %      Immat GRANS % 0 %      Lymphocytes Relative 27 %      Monocytes Relative 8 %      Eosinophils Relative 2 %      Basophils Relative 1 %      Neutrophils Absolute 4 92 Thousands/µL      Immature Grans Absolute 0 02 Thousand/uL      Lymphocytes Absolute 2 10 Thousands/µL      Monocytes Absolute 0 60 Thousand/µL      Eosinophils Absolute 0 19 Thousand/µL      Basophils Absolute 0 08 Thousands/µL                  No orders to display              Procedures  Procedures         ED Course                               SBIRT 22yo+    Flowsheet Row Most Recent Value   Initial Alcohol Screen: US AUDIT-C     1  How often do you have a drink containing alcohol? 0 Filed at: 06/06/2023 1419   2  How many drinks containing alcohol do you have on a typical day you are drinking? 0 Filed at: 06/06/2023 1419   3a  Male UNDER 65: How often do you have five or more drinks on one occasion? 0 Filed at: 06/06/2023 1419   3b  FEMALE Any Age, or MALE 65+: How often do you have 4 or more drinks on one occassion? 0 Filed at: 06/06/2023 1419   Audit-C Score 0 Filed at: 06/06/2023 1419   PEDRO PABLO: How many times in the past year have you    Used an illegal drug or used a prescription medication for non-medical reasons? Never Filed at: 06/06/2023 1419                    Medical Decision Making  40-year-old female presenting to the emerged department with rectal pain    Differential diagnose includes external hemorrhoids, rectal prolapse, anal fissure  On exam found to have internal hemorrhoids that have been prolapsed along with mucosal surface necrosis  Case discussed with colorectal surgeon Dr Yogesh Sanches who recommends that I reduce it  Will attempt reduction after pain control  After perianal block with ropivacaine, manually reduced prolapsed internal hemorrhoids  Successful reduction, patient asked to follow-up with colorectal surgeon  MiraLAX Colace prescribed    Amount and/or Complexity of Data Reviewed  Labs: ordered  Risk  OTC drugs  Prescription drug management  Disposition  Final diagnoses:   Prolapsed internal hemorrhoids     Time reflects when diagnosis was documented in both MDM as applicable and the Disposition within this note     Time User Action Codes Description Comment    6/6/2023  3:29 PM Olu Iraheta Add [K64 8] Prolapsed internal hemorrhoids       ED Disposition     ED Disposition   Discharge    Condition   Stable    Date/Time   Tue Jun 6, 2023  3:29 PM    [address] discharge to home/self care                 Follow-up Information     Follow up With Specialties Details Why [address], [address] [place] [address], Nurse Practitioner   [facility] [id number]  [address]  [phone]            Patient's Medications   Discharge Prescriptions    DOCUSATE SODIUM (COLACE) 100 MG CAPSULE    Take 1 capsule (100 mg total) by mouth every 12 (twelve) hours       Start Date: 6/6/2023  End Date: --       Order Dose: 100 mg       Quantity: 60 capsule    Refills: 0    NAPROXEN (NAPROSYN) 375 MG TABLET    Take 1 tablet (375 mg total) by mouth 2 (two) times a day with meals       Start Date: 6/6/2023  End Date: --       Order Dose: 375 mg       Quantity: 20 tablet    Refills: 0    POLYETHYLENE GLYCOL (GLYCOLAX) 17 GM/SCOOP POWDER    Take 17 g by mouth 2 (two) times a day for 7 days       Start Date: 6/6/2023  End Date: 6/13/2023 Order Dose: 17 g       Quantity: 238 g    Refills: 0           PDMP Review     None          ED Provider  Electronically Signed by           Miracle Molina MD  06/06/23 5015

## 2023-06-06 NOTE — Clinical Note
Juan Sanchez was seen and treated in our emergency department on 6/6/2023  Diagnosis:     Jameson Clinton  may return to work on return date  She may return on this date: 06/09/2023         If you have any questions or concerns, please don't hesitate to call        Hannah Alaniz MD    ______________________________           _______________          _______________  Hospital Representative                              Date                                Time

## 2023-06-07 RX ORDER — HYDROCORTISONE ACETATE 25 MG/1
25 SUPPOSITORY RECTAL 2 TIMES DAILY
Qty: 12 SUPPOSITORY | Refills: 0 | Status: SHIPPED | OUTPATIENT
Start: 2023-06-07

## 2023-06-22 ENCOUNTER — TELEPHONE (OUTPATIENT)
Dept: FAMILY MEDICINE CLINIC | Facility: CLINIC | Age: 43
End: 2023-06-22

## 2024-04-30 ENCOUNTER — TELEPHONE (OUTPATIENT)
Dept: FAMILY MEDICINE CLINIC | Facility: CLINIC | Age: 44
End: 2024-04-30

## 2024-04-30 NOTE — TELEPHONE ENCOUNTER
Pt left a vm requesting a call to schedule an appt       Called pt to schedule appt, no answer, unable to lm due to no vm setup

## 2024-05-06 ENCOUNTER — OFFICE VISIT (OUTPATIENT)
Dept: FAMILY MEDICINE CLINIC | Facility: CLINIC | Age: 44
End: 2024-05-06

## 2024-05-06 VITALS
RESPIRATION RATE: 18 BRPM | SYSTOLIC BLOOD PRESSURE: 102 MMHG | TEMPERATURE: 98.2 F | HEART RATE: 93 BPM | DIASTOLIC BLOOD PRESSURE: 69 MMHG | HEIGHT: 62 IN | BODY MASS INDEX: 37.06 KG/M2 | OXYGEN SATURATION: 98 % | WEIGHT: 201.4 LBS

## 2024-05-06 DIAGNOSIS — G89.29 CHRONIC BILATERAL LOW BACK PAIN WITHOUT SCIATICA: Primary | ICD-10-CM

## 2024-05-06 DIAGNOSIS — J30.9 ALLERGIC RHINITIS, UNSPECIFIED SEASONALITY, UNSPECIFIED TRIGGER: ICD-10-CM

## 2024-05-06 DIAGNOSIS — M54.50 CHRONIC BILATERAL LOW BACK PAIN WITHOUT SCIATICA: Primary | ICD-10-CM

## 2024-05-06 PROCEDURE — 99213 OFFICE O/P EST LOW 20 MIN: CPT | Performed by: FAMILY MEDICINE

## 2024-05-06 RX ORDER — FLUTICASONE PROPIONATE 50 MCG
1 SPRAY, SUSPENSION (ML) NASAL DAILY
Qty: 18 G | Refills: 5 | Status: SHIPPED | OUTPATIENT
Start: 2024-05-06

## 2024-05-06 RX ORDER — ACETAMINOPHEN 500 MG
1000 TABLET ORAL EVERY 8 HOURS PRN
Qty: 30 TABLET | Refills: 0 | Status: SHIPPED | OUTPATIENT
Start: 2024-05-06

## 2024-05-06 RX ORDER — METHOCARBAMOL 500 MG/1
500 TABLET, FILM COATED ORAL
Qty: 14 TABLET | Refills: 0 | Status: SHIPPED | OUTPATIENT
Start: 2024-05-06 | End: 2024-05-20

## 2024-05-06 RX ORDER — NAPROXEN 500 MG/1
500 TABLET ORAL 2 TIMES DAILY WITH MEALS
Qty: 30 TABLET | Refills: 0 | Status: SHIPPED | OUTPATIENT
Start: 2024-05-06

## 2024-05-06 NOTE — PATIENT INSTRUCTIONS
Ejercicios para la espalda baja   CUIDADO AMBULATORIO:   Los ejercicios para la espalda baja ayudan a sanar y a fortalecer los músculos de vargas espalda para evitar otra lesión. Pregúntele a vargas médico si usted necesita acudir con un fisioterapeuta para que le indique ejercicios más avanzado.  Busque atención médica de inmediato si:  Usted tiene dolor severo que le impide moverse.      Llame a vargas médico si:  Vargas dolor empeora.    Usted tiene un dolor nuevo.    Usted tiene preguntas o inquietudes acerca de vargas condición o cuidado.    Realice los ejercicios para la espalda baja de manera foster:  Yumiko los ejercicios sobre berry colchoneta o superficie firme (no sobre berry cama). Berry superficie firme sostendrá vargas columna y evitará el dolor lumbar.    Muévase lenta y suavemente. Evite movimientos rápidos o bruscos.    Respire normalmente. No contenga la respiración.    Deténgase si siente dolor. Es normal sentir alguna molestia al principio, vee no debería sentir dolor. Practicar los ejercicios con regularidad ayudará a disminuir vargas incomodidad con el paso del tiempo.    Ejercicios para la espalda baja: Vargas médico podría recomendarle que realice ejercicios para la espalda de 10 a 30 minutos cada día. También podría recomendarle que yumiko ejercicios de 1 a 3 veces cada día. Pregunte a vargas médico cuáles ejercicios son los mejores para usted y con qué frecuencia hacerlos.  Bombeo del tobillo: Acuéstese boca arriba. Levante vargas pie (con josse dedos apuntando hacia vargas sal). Luego, baje vargas pie (con los dedos apuntando lejos de usted). Repita jeovany ejercicio 10 veces en cada lado.         Deslizamiento de talón: Acuéstese boca arriba. Muy despacio doble berry pierna y luego enderécela. Luego, doble la otra pierna y enderécela. Repita 10 veces en cada lado.         Inclinación pélvica: Acuéstese boca arriba con josse rodillas dobladas y josse pies planos sobre el piso. Coloque josse brazos en berry posición relajada junto a vargas cuerpo. Contraiga los  músculos de vargas abdomen y aplane vargas espalda contra el piso. Sostenga está posición por 5 segundos. Repita 5 veces.         Estiramiento de la espalda: Acuéstese boca arriba con josse lucian detrás de vargas sal. Doble josse rodillas y gire la mitad de vargas cuerpo hacia un lado. Mantenga esta posición por 10 segundos. Repita 3 veces en cada lado.         Levantamiento de la pierna estirada: Acuéstese boca arriba con berry pierna estirada. Doble la otra rodilla. Contraiga vargas abdomen y luego levante lentamente la pierna estirada entre 6 a 12 pulgadas del piso. Mantenga esta posición por 1 a 5 segundos. Baje vargas pierna lentamente. Repita 10 veces en cada pierna.         Rodillas al pecho: Acuéstese boca arriba con josse rodillas dobladas y josse pies planos sobre el piso. Traiga berry de las rodillas hacia vargas pecho y sosténgala por 5 segundos. Regrese vargas pierna a la posición inicial. Levante la otra rodilla hacia el pecho y sosténgala por 5 segundos. William esto 5 veces en cada lado.         Posición mary camello: Coloque josse lucian y rodillas sobre el piso. Arquee vargas espalda hacia arriba, hacia el techo y baje vargas sal. Arquee vargas la dorsal lo más posible. Sostenga está posición por 5 segundos. Levante vargas sal hacia arriba y baje vargas pecho hacia el piso. Sostenga está posición por 5 segundos. William 3 series o emory se le indique.         Posición de cuclillas contra la pared: Párese con vargas espalda contra la pared. Contraiga los músculos de vargas abdomen. Lentamente deslice vargas cuerpo hasta que josse rodillas queden dobladas en un ángulo de 45 grados. Mantenga esta posición por 5 segundos. Deslice lentamente vargas espalda hacia arriba hasta quedar de pie. Repita 10 veces.         Posición de acurrucarse: Acuéstese boca arriba con josse rodillas dobladas y josse pies planos sobre el piso. Coloque josse lucian con las aiden hacia abajo debajo de la curva de la parte baja de vargas espalda. Después, con josse codos sobre el piso, levante josse hombros y pecho  entre 2 a 3 pulgadas. Mantenga vargas sal a la misma altura de josse hombros. Mantenga esta posición por 5 segundos. Cuando usted pueda hacer jeovany ejercicio sin sentir dolor por 10 a 15 segundos, puede entonces añadir berry rotación. Mientras josse hombros y vargas pecho estén levantados del piso, voltee levemente hacia la izquierda y sostenga. Repita en el otro lado.         Ejercicio pájaro omer: Coloque josse lucian y rodillas sobre el piso. Mantenga josse muñecas directamente debajo de josse hombros y josse rodillas directamente debajo de josse caderas. Contraiga vargas ombligo hacia adentro en dirección a vargas columna. No estire ni arquee vargas espalda. Ponga tensos josse músculos abdominales. Levante un brazo extendido para que se alinee con vargas sal. Luego, levante la pierna opuesta a vargas brazo. Mantenga esta posición por 15 segundos. Baje vargas brazo y pierna lentamente y cambie de lado. William 5 series.       Acuda a la consulta de control con vargas médico según las indicaciones: Anote josse preguntas para que se acuerde de hacerlas reyna josse visitas.  © Copyright Merative 2023 Information is for End User's use only and may not be sold, redistributed or otherwise used for commercial purposes.  Esta información es sólo para uso en educación. Vargas intención no es darle un consejo médico sobre enfermedades o tratamientos. Colsulte con vargas médico, enfermera o farmacéutico antes de seguir cualquier régimen médico para saber si es seguro y efectivo para usted.    Ejercicios para fortalecer los músculos del tronco   CUIDADO AMBULATORIO:   Lo que debe saber sobre los ejercicios para fortalecer los músculos del tronco: El tronco incluye los músculos de la parte baja de la espalda, la cadera, la pelvis y el abdomen. Los ejercicios para fortalecer los músculos del tronco ayudan a sanar y fortalecer estos músculos. [place] ayuda a prevenir otra lesión y mantiene la pelvis, la columna vertebral y la cadera en la posición correcta.  Llame a vargas médico o  fisioterapeuta si:  Tiene dolor amadou o creciente cuando hace ejercicio o está en reposo.    Tiene preguntas o inquietudes acerca de los ejercicios de hombros.    Consejos de seguridad: Consulte a vargas médico antes de empezar un régimen de ejercicios. Un fisioterapeuta puede enseñarle a hacer ejercicios para fortalecer los músculos del tronco de forma foster.  William los ejercicios sobre berry colchoneta o superficie firme. Berry superficie firme sostendrá vargas columna y evitará el dolor lumbar. No william estos ejercicios en berry cama.    Muévase lenta y suavemente. Evite movimientos rápidos o bruscos.    Deténgase si siente dolor. Podría sentir alguna molestia al principio, vee no debería sentir dolor. Informe a vargas médico o fisioterapeuta si tiene dolor mientras hace ejercicio. Practicar los ejercicios con regularidad ayudará a disminuir vargas incomodidad con el paso del tiempo.    Respire normalmente reyna los ejercicios. No contenga la respiración. [place] puede aumentar la presión arterial y evitar el fortalecimiento muscular. Vargas médico le dirá cuándo inhalar y exhalar reyna el ejercicio.    Comience todos josse ejercicios con tonificación abdominal. La tonificación abdominal ayuda a calentar los músculos del tronco. También puede practicar tonificación abdominal reyna el día. Acuéstese boca arriba con josse rodillas dobladas y josse pies planos sobre el piso. Coloque josse brazos en berry posición relajada junto a vargas cuerpo. Ponga tensos josse músculos abdominales. Contraiga el ombligo hacia adentro en dirección a la columna. Sostenga está posición por 5 segundos. Relaje josse músculos. Repita 10 veces.       Ejercicios para fortalecer los músculos del tronco: Vargas médico le indicará con qué frecuencia hacer estos ejercicios. También le dirá cuántas repeticiones de cada ejercicio debe hacer. William cada ejercicio reyna 5 segundos o según lo indicado. A medida que se fortalezca, aumente a 10 a 15 segundos. Puede hacer algunos de estos  ejercicios sobre berry pelota de estabilidad o con un peso. Pregunte a vargas médico cómo utilizar berry pelota de estabilidad o un peso para estos ejercicios:  Mustafa: Acuéstese boca arriba con josse rodillas dobladas y josse pies planos sobre el piso. Relaje josse brazos a los lados de vargas cuerpo. Contraiga los músculos de los glúteos y luego levante josse caderas a 1 pulgada del piso. Sostenga está posición por 5 segundos. Cuando usted pueda hacer jeovany ejercicio sin sentir dolor por 10 segundos, puede entonces aumentar la distancia de josse caderas al piso. Berry buena meta es poder llegar a levantar josse caderas tanto que josse hombros, caderas y las rodillas estén en berry línea recta.         Insecto muerto: Acuéstese boca arriba con josse rodillas dobladas y josse pies planos sobre el piso. Coloque josse brazos en berry posición relajada junto a vargas cuerpo. Empiece con la tonificación abdominal. Luego levante berry pierna, manteniendo vargas rodilla doblada. Sostenga está posición por 5 segundos. Repita el ejercicio con la otra pierna. Cuando pueda realizar jeovany ejercicio sin sentir dolor por 10 a 15 segundos, entonces usted puede levantar berry pierna estirada y sostener. Repita el ejercicio con la otra pierna.         Cuadrúpedos: Coloque josse lucian y rodillas sobre el piso. Mantenga josse muñecas directamente debajo de josse hombros y josse rodillas directamente debajo de josse caderas. Contraiga vargas ombligo hacia adentro en dirección a vargas columna. No estire ni arquee vargas espalda. Contraiga josse músculos abdominales por debajo de vargas ombligo. Sostenga está posición por 5 segundos. Cuando usted pueda hacer jeovany ejercicio sin sentir dolor por 10 a 15 segundos, entonces puede extender un brazo y sostenerlo. Repita en el otro lado.         Ejercicios de mustafa lateral:     Mustafa lateral de pie: De pie junto a berry pared, extienda un brazo hacia la pared. Coloque la arellano de vargas mano plana sobre la pared con josse dedos hacia arriba. Empiece con la tonificación  abdominal. Después, sin  josse pies, lentamente doble vargas brazo a 90 grados. Sostenga está posición por 5 segundos. Repita en el otro lado. Cuando usted pueda hacer jeovany ejercicio sin sentir dolor por 10 a 15 segundos, entonces puede hacer un mustafa lateral doblando la pierna en el piso.         Mustafa lateral con pierna doblada: Acuéstese de lado con josse piernas, caderas y hombros en línea recta. Apóyese en vargas antebrazo a fin de que vargas codo esté directamente debajo de vargas hombro. Doble josse rodillas a 90 grados. Empiece con la tonificación abdominal. Después, levante josse caderas y mantenga el equilibrio sobre vargas antebrazo y rodillas. Sostenga está posición por 5 segundos. Repita en el otro lado. Cuando usted pueda hacer jeovany ejercicio sin sentir dolor por 10 a 15 segundos, entonces puede hacer un mustafa lateral con la pierna estirada en el piso.         Mustafa lateral con pierna estirada: Acuéstese de lado con josse piernas, caderas y hombros en línea recta. Apóyese en vargas antebrazo a fin de que vargas codo esté directamente debajo de vargas hombro. Empiece con la tonificación abdominal. Levante josse caderas del piso y mantenga el equilibrio sobre vargas antebrazo y la parte de afuera de vargas pie flexionado. No permita que vargas tobillo se doble de lado. Sostenga está posición por 5 segundos. Repita en el otro lado. Cuando usted pueda hacer jeovany ejercicio sin sentir dolor por 10 a 15 segundos, solicite a vargas médico que le enseñe ejercicios de un nivel más avanzado.       [place]: Acuéstese boca abajo. Extienda los brazos hacia adelante en el piso. [place] los músculos abdominales y levante la mano derecha y la pierna izquierda del suelo. Mantenga esta posición. Despacio regrese a la posición inicial. Apriete los músculos abdominales y levante la mano izquierda y la pierna derecha del suelo. Mantenga esta posición. Despacio regrese a la posición inicial.         [place]meja: Acuéstese de costado con las rodillas dobladas. Coloque el brazo  inferior bajo la sal para mantener el lorenzo en línea. Ponga vargas mano superior sobre la cadera para evitar que se mueva la pelvis. Junte los talones y manténgalos juntos reyna jeovany ejercicio. Lentamente levante la rodilla superior hacia el techo. Luego baje la pierna hasta que las rodillas vuelvan a juntarse. Repita jeovany ejercicio 10 veces. Luego cambie de lado y william el ejercicio 10 veces con la otra pierna.         Posición de acurrucarse: Acuéstese boca arriba con jaylin rodillas dobladas y jaylin pies planos sobre el piso. Coloque las lucian con las aiden hacia abajo por debajo de la parte baja de vargas espalda. Después, con los codos sobre el piso, levante los hombros y el pecho de 2 a 3 pulgadas del piso. Mantenga vargas sal a la misma altura de jaylin hombros. Mantenga esta posición. Despacio regrese a la posición inicial.         Levantamiento de la pierna estirada: Acuéstese boca arriba con berry pierna estirada. Doble la otra rodilla y coloque el pie en posición plana sobre el piso. Ponga tensos jaylin músculos abdominales. Mantenga la pierna recta y levántela lentamente de 6 a 12 pulgadas del piso. Mantenga esta posición. Baje vargas pierna lentamente. William tantas repeticiones emory le indicaron de jeovany lado. Repita el ejercicio con la otra pierna.         [place]: Acuéstese boca abajo. Doble los codos y coloque los antebrazos en posición plana sobre el suelo. Levante vargas pecho, el estómago y las rodillas del suelo. Asegúrese de que los codos estén por debajo de los hombros. Vargas cuerpo debe estar en línea recta. No deje que las caderas o la parte baja de la espalda se hundan hacia el suelo. Contraiga los músculos abdominales y sostenga reyna 15 segundos. Para hacer jeovany ejercicio más difícil, sostenga reyna 30 segundos o levante 1 pierna a la vez.         [place]as: Acuéstese boca arriba. Doble ambas rodillas y llévelas hacia vargas pecho. Jaylin pantorrillas deben estar paralelas al piso. Coloque las aiden de las lucian en la  parte posterior de la sal. Estire la pierna derecha y manténgala levantada 2 pulgadas del piso. Levante la sal y los hombros del piso y gire hacia la izquierda. Mantenga la sal y los hombros levantados. Doble la rodilla derecha mientras endereza la pierna izquierda. Mantenga la pierna izquierda 2 pulgadas sobre el piso. Gire la sal y el pecho hacia la pierna izquierda. Siga enderezando 1 pierna a la vez y gire.       Acuda a josse consultas de control con vargas médico o fisioterapeuta según le indicaron: Anote josse preguntas para que se acuerde de hacerlas reyna josse visitas.  © Copyright Merative 2023 Information is for End User's use only and may not be sold, redistributed or otherwise used for commercial purposes.  Esta información es sólo para uso en educación. Vargas intención no es darle un consejo médico sobre enfermedades o tratamientos. Colsulte con vargas médico, enfermera o farmacéutico antes de seguir cualquier régimen médico para saber si es seguro y efectivo para usted.

## 2024-05-06 NOTE — PROGRESS NOTES
Name: Joel Mccoy      : 1980      MRN: [id number]  Encounter Provider: Ethan Menjivar MD  Encounter Date: 2024   Encounter department: [facility]    Assessment & Plan     1. Chronic bilateral low back pain without sciatica  Assessment & Plan:  No red flag symptoms     PLAN:   - Low back exercises and stretches were shown and information package given   - Will do Ibuprofen 600 mg Q 8hrs PRN and Tylenol 650 mg Q8hrs PRN   - Robaxin 500 mg  for muscle relaxation for 2 weeks    - Return in 6 weeks post physical therapy for reevaluation      Orders:  -     methocarbamol (ROBAXIN) 500 mg tablet; Take 1 tablet (500 mg total) by mouth daily at bedtime for 14 days  -     naproxen (Naprosyn) 500 mg tablet; Take 1 tablet (500 mg total) by mouth 2 (two) times a day with meals  -     acetaminophen (TYLENOL) 500 mg tablet; Take 2 tablets (1,000 mg total) by mouth every 8 (eight) hours as needed for mild pain or moderate pain    2. Allergic rhinitis, unspecified seasonality, unspecified trigger  -     fluticasone (FLONASE) 50 mcg/act nasal spray; 1 spray into each nostril daily       Subjective     Patient is Macedonian speaking only and voice  services was utilized to conduct office visit.        Joel Mccoy is a 43 y.o. female presenting today for back pain          Back Pain  The current episode started more than 1 month ago. The problem occurs constantly. The problem has been gradually worsening since onset. The pain is present in the sacro-iliac and lumbar spine. The quality of the pain is described as aching and shooting. The pain does not radiate. The pain is at a severity of 7/10 (6-9). The pain is moderate. The symptoms are aggravated by standing and lying down. Pertinent negatives include no abdominal pain, chest pain, dysuria, fever, numbness, paresthesias, tingling, weakness or weight loss.     Review of Systems   Constitutional:  Negative  for chills, fever and weight loss.   HENT:  Negative for ear pain and sore throat.    Eyes:  Negative for pain and visual disturbance.   Respiratory:  Negative for cough and shortness of breath.    Cardiovascular:  Negative for chest pain and palpitations.   Gastrointestinal:  Negative for abdominal pain and vomiting.   Genitourinary:  Negative for dysuria and hematuria.   Musculoskeletal:  Positive for back pain. Negative for arthralgias.   Skin:  Negative for color change and rash.   Neurological:  Negative for tingling, seizures, syncope, weakness, numbness and paresthesias.   All other systems reviewed and are negative.      Past Medical History:   Diagnosis Date   • Vertigo      Past Surgical History:   Procedure Laterality Date   • ABDOMINAL SURGERY     •  SECTION     • CHOLECYSTECTOMY     • TUBAL LIGATION       Family History   Problem Relation Age of Onset   • Cancer Mother    • Cancer Sister    • Cancer Daughter    • Cancer Maternal Aunt      Social History     Socioeconomic History   • Marital status: Single     Spouse name: None   • Number of children: None   • Years of education: None   • Highest education level: None   Occupational History   • None   Tobacco Use   • Smoking status: Never   • Smokeless tobacco: Never   Vaping Use   • Vaping status: Never Used   Substance and Sexual Activity   • Alcohol use: Not Currently   • Drug use: Never   • Sexual activity: None   Other Topics Concern   • None   Social History Narrative   • None     Social Determinants of Health     Financial Resource Strain: Low Risk  (2024)    Overall Financial Resource Strain (CARDIA)    • Difficulty of Paying Living Expenses: Not hard at all   Food Insecurity: No Food Insecurity (2024)    Hunger Vital Sign    • Worried About Running Out of Food in the Last Year: Never true    • Ran Out of Food in the Last Year: Never true   Transportation Needs: No Transportation Needs (2024)    PRAPARE - Transportation    •  Lack of Transportation (Medical): No    • Lack of Transportation (Non-Medical): No   Physical Activity: Not on file   Stress: Not on file   Social Connections: Not on file   Intimate Partner Violence: Not on file   Housing Stability: Low Risk  (5/6/2024)    Housing Stability Vital Sign    • Unable to Pay for Housing in the Last Year: No    • Number of Places Lived in the Last Year: 1    • Unstable Housing in the Last Year: No     Current Outpatient Medications on File Prior to Visit   Medication Sig   • docusate sodium (COLACE) 100 mg capsule Take 1 capsule (100 mg total) by mouth every 12 (twelve) hours   • ergocalciferol (VITAMIN D2) 50,000 units Take 1 capsule (50,000 Units total) by mouth once a week   • hydrocortisone (ANUSOL-HC) 25 mg suppository Insert 1 suppository (25 mg total) into the rectum 2 (two) times a day   • lidocaine (Lidoderm) 5 % Apply 1 patch topically daily Remove & Discard patch within 12 hours or as directed by MD   • loratadine (CLARITIN) 10 mg tablet Take 1 tablet (10 mg total) by mouth daily   • polyethylene glycol (GLYCOLAX) 17 GM/SCOOP powder Take 17 g by mouth 2 (two) times a day for 7 days   • pseudoephedrine (SUDAFED) 120 MG 12 hr tablet Take 1 tablet (120 mg total) by mouth 2 (two) times a day   • [DISCONTINUED] acetaminophen (TYLENOL) 500 mg tablet Take 1 tablet (500 mg total) by mouth every 6 (six) hours as needed for mild pain   • [DISCONTINUED] fluticasone (FLONASE) 50 mcg/act nasal spray 1 spray into each nostril daily   • [DISCONTINUED] fluticasone (FLONASE) 50 mcg/act nasal spray 1 spray into each nostril daily   • [DISCONTINUED] naproxen (NAPROSYN) 375 mg tablet Take 1 tablet (375 mg total) by mouth 2 (two) times a day with meals     No Known Allergies  Immunization History   Administered Date(s) Administered   • Influenza, injectable, quadrivalent, preservative free 0.5 mL 11/17/2021   • Tdap 07/10/2017       Objective     /69 (BP Location: Left arm, Patient Position:  "Sitting, Cuff Size: Standard)   Pulse 93   Temp 98.2 °F (36.8 °C) (Temporal)   Resp 18   Ht 5' 1.5\" (1.562 m)   Wt 91.4 kg (201 lb 6.4 oz)   LMP 04/29/2024 (Approximate)   SpO2 98%   BMI 37.44 kg/m²     Physical Exam  Constitutional:       Appearance: Normal appearance.   HENT:      Head: Normocephalic and atraumatic.   Eyes:      Extraocular Movements: Extraocular movements intact.      Pupils: Pupils are equal, round, and reactive to light.   Cardiovascular:      Rate and Rhythm: Normal rate and regular rhythm.      Pulses: Normal pulses.      Heart sounds: Normal heart sounds.   Pulmonary:      Effort: Pulmonary effort is normal. No respiratory distress.      Breath sounds: Normal breath sounds. No stridor. No wheezing, rhonchi or rales.   Abdominal:      General: Bowel sounds are normal. There is no distension.      Palpations: Abdomen is soft.      Tenderness: There is no abdominal tenderness.   Musculoskeletal:      Lumbar back: Spasms and tenderness present. No swelling, edema, deformity, signs of trauma or lacerations. Normal range of motion. Negative right straight leg raise test and negative left straight leg raise test.   Skin:     General: Skin is warm.      Capillary Refill: Capillary refill takes less than 2 seconds.   Neurological:      General: No focal deficit present.      Mental Status: She is alert and oriented to person, place, and time.   Psychiatric:         Mood and Affect: Mood normal.         Behavior: Behavior normal.     Ethan Menjivar MD    "

## 2024-05-06 NOTE — ASSESSMENT & PLAN NOTE
No red flag symptoms     PLAN:   - Low back exercises and stretches were shown and information package given   - Will do Ibuprofen 600 mg Q 8hrs PRN and Tylenol 650 mg Q8hrs PRN   - Robaxin 500 mg  for muscle relaxation for 2 weeks    - Return in 6 weeks post physical therapy for reevaluation

## 2024-05-14 ENCOUNTER — TELEPHONE (OUTPATIENT)
Dept: OBGYN CLINIC | Facility: CLINIC | Age: 44
End: 2024-05-14

## 2024-05-18 DIAGNOSIS — M54.50 CHRONIC BILATERAL LOW BACK PAIN WITHOUT SCIATICA: ICD-10-CM

## 2024-05-18 DIAGNOSIS — G89.29 CHRONIC BILATERAL LOW BACK PAIN WITHOUT SCIATICA: ICD-10-CM

## 2024-05-20 RX ORDER — NAPROXEN 500 MG/1
500 TABLET ORAL 2 TIMES DAILY WITH MEALS
Qty: 30 TABLET | Refills: 0 | OUTPATIENT
Start: 2024-05-20

## 2024-11-08 ENCOUNTER — HOSPITAL ENCOUNTER (EMERGENCY)
Facility: HOSPITAL | Age: 44
Discharge: HOME | End: 2024-11-08
Payer: MEDICARE

## 2024-11-08 VITALS
RESPIRATION RATE: 20 BRPM | HEART RATE: 76 BPM | WEIGHT: 184.2 LBS | SYSTOLIC BLOOD PRESSURE: 127 MMHG | OXYGEN SATURATION: 96 % | TEMPERATURE: 98.5 F | BODY MASS INDEX: 34.24 KG/M2 | DIASTOLIC BLOOD PRESSURE: 83 MMHG

## 2024-11-08 DIAGNOSIS — R11.0 NAUSEA: ICD-10-CM

## 2024-11-08 DIAGNOSIS — R42 DIZZINESS: Primary | ICD-10-CM

## 2024-11-08 DIAGNOSIS — R51.9 HEADACHE: ICD-10-CM

## 2024-11-08 LAB
ATRIAL RATE: 65 BPM
P AXIS: 40 DEGREES
PR INTERVAL: 194 MS
QRS AXIS: 12 DEGREES
QRSD INTERVAL: 98 MS
QT INTERVAL: 394 MS
QTC INTERVAL: 410 MS
T WAVE AXIS: 39 DEGREES
VENTRICULAR RATE: 65 BPM

## 2024-11-08 PROCEDURE — 93005 ELECTROCARDIOGRAM TRACING: CPT

## 2024-11-08 PROCEDURE — 93010 ELECTROCARDIOGRAM REPORT: CPT | Performed by: INTERNAL MEDICINE

## 2024-11-08 PROCEDURE — 99284 EMERGENCY DEPT VISIT MOD MDM: CPT

## 2024-11-08 RX ORDER — METOCLOPRAMIDE 10 MG/1
10 TABLET ORAL ONCE
Status: COMPLETED | OUTPATIENT
Start: 2024-11-08 | End: 2024-11-08

## 2024-11-08 RX ORDER — ONDANSETRON 4 MG/1
4 TABLET, ORALLY DISINTEGRATING ORAL EVERY 6 HOURS PRN
Qty: 15 TABLET | Refills: 0 | Status: SHIPPED | OUTPATIENT
Start: 2024-11-08

## 2024-11-08 RX ORDER — MECLIZINE HYDROCHLORIDE 25 MG/1
25 TABLET ORAL 3 TIMES DAILY PRN
Qty: 30 TABLET | Refills: 0 | Status: SHIPPED | OUTPATIENT
Start: 2024-11-08

## 2024-11-08 RX ORDER — FAMOTIDINE 20 MG/1
20 TABLET, FILM COATED ORAL ONCE
Status: COMPLETED | OUTPATIENT
Start: 2024-11-08 | End: 2024-11-08

## 2024-11-08 RX ORDER — ONDANSETRON 4 MG/1
4 TABLET, ORALLY DISINTEGRATING ORAL ONCE
Status: COMPLETED | OUTPATIENT
Start: 2024-11-08 | End: 2024-11-08

## 2024-11-08 RX ORDER — MECLIZINE HCL 12.5 MG 12.5 MG/1
25 TABLET ORAL ONCE
Status: COMPLETED | OUTPATIENT
Start: 2024-11-08 | End: 2024-11-08

## 2024-11-08 RX ORDER — MAGNESIUM HYDROXIDE/ALUMINUM HYDROXICE/SIMETHICONE 120; 1200; 1200 MG/30ML; MG/30ML; MG/30ML
30 SUSPENSION ORAL ONCE
Status: DISCONTINUED | OUTPATIENT
Start: 2024-11-08 | End: 2024-11-08 | Stop reason: HOSPADM

## 2024-11-08 RX ADMIN — MECLIZINE 25 MG: 12.5 TABLET ORAL at 16:37

## 2024-11-08 RX ADMIN — METOCLOPRAMIDE 10 MG: 10 TABLET ORAL at 17:15

## 2024-11-08 RX ADMIN — FAMOTIDINE 20 MG: 20 TABLET ORAL at 16:38

## 2024-11-08 RX ADMIN — ONDANSETRON 4 MG: 4 TABLET, ORALLY DISINTEGRATING ORAL at 16:38

## 2024-11-08 NOTE — ED PROVIDER NOTES
Time reflects when diagnosis was documented in both MDM as applicable and the Disposition within this note       Time User Action Codes Description Comment    11/8/2024  5:03 PM Pola Ovalles [R42] Dizziness     11/8/2024  5:03 PM Pola Ovalles [R11.0] Nausea     11/8/2024  5:03 PM Pola Ovalles [R51.9] Headache           ED Disposition       ED Disposition   Discharge    Condition   Stable    Date/Time   Fri Nov 8, 2024  5:10 PM    Comment   Joel Mccoy discharge to home/self care.                   Assessment & Plan       Medical Decision Making  Amount and/or Complexity of Data Reviewed  ECG/medicine tests:  Decision-making details documented in ED Course.    Risk  OTC drugs.  Prescription drug management.      42 y/o F presenting with 1 week dizziness, nausea, intermittent headaches. VSS. Pt has no focal exam findings and is well appearing. EKG NSR with no evidence of arrhythmia. Will treat symptomatically and re-assess. See ED course. Recommend close pcp f/u with rted precautions.     ED Course as of 11/08/24 1720   Fri Nov 08, 2024   1636 ECG 12 lead  Procedure Note: EKG  Date/Time: 11/08/24 4:36 PM   Interpreted by: Pola Ovalles MD  Indications / Diagnosis: dizziness  ECG reviewed by me, the ED Provider: yes   The EKG demonstrates:  Rhythm: normal sinus  Intervals: normal intervals  Axis: normal axis  QRS/Blocks: normal QRS  ST Changes: No acute ST Changes, no STD/STEFFEN.     1719 Pt requesting script for meclizine and wants to go home. Remains well appearing and stable for discharge       Medications   aluminum-magnesium hydroxide-simethicone (MAALOX) oral suspension 30 mL (30 mL Oral Not Given 11/8/24 1643)   ondansetron (ZOFRAN-ODT) dispersible tablet 4 mg (4 mg Oral Given 11/8/24 1638)   famotidine (PEPCID) tablet 20 mg (20 mg Oral Given 11/8/24 1638)   meclizine (ANTIVERT) tablet 25 mg (25 mg Oral Given 11/8/24 1637)   metoclopramide (REGLAN) tablet 10 mg (10 mg Oral Given  24 1715)       ED Risk Strat Scores                           SBIRT 20yo+      Flowsheet Row Most Recent Value   Initial Alcohol Screen: US AUDIT-C     1. How often do you have a drink containing alcohol? 0 Filed at: 2024 1630   2. How many drinks containing alcohol do you have on a typical day you are drinking?  0 Filed at: 2024 1630   3b. FEMALE Any Age, or MALE 65+: How often do you have 4 or more drinks on one occassion? 0 Filed at: 2024 1630   Audit-C Score 0 Filed at: 2024 1630   PEDRO PABLO: How many times in the past year have you...    Used an illegal drug or used a prescription medication for non-medical reasons? Never Filed at: 2024 1630                            History of Present Illness       Chief Complaint   Patient presents with    Dizziness     Reports nausea, dizziness and vomiting for a week - hx of vertigo but states this feels different as she is getting headaches as well - no meds taken        Past Medical History:   Diagnosis Date    Vertigo       Past Surgical History:   Procedure Laterality Date    ABDOMINAL SURGERY       SECTION      CHOLECYSTECTOMY      TUBAL LIGATION        Family History   Problem Relation Age of Onset    Cancer Mother     Cancer Sister     Cancer Daughter     Cancer Maternal Aunt       Social History     Tobacco Use    Smoking status: Never    Smokeless tobacco: Never   Vaping Use    Vaping status: Never Used   Substance Use Topics    Alcohol use: Not Currently    Drug use: Never      E-Cigarette/Vaping    E-Cigarette Use Never User       E-Cigarette/Vaping Substances    Nicotine No     THC No     CBD No     Flavoring No     Other No     Unknown No       I have reviewed and agree with the history as documented.     HPI    Patient is a 42 y/o F with pmh vertigo presenting with dizziness, nausea. Sx present off/on x1 week. Does not feel like prior episodes of vertigo, no room spinning. Now having intermittent generalized headaches. No  current HA, took excedrin at 12pm which helped. Vomiting x1 today with ongoing nausea. Denies cp, sob, syncope, numbness, weakness, speech difficulty.     Review of Systems   Constitutional:  Negative for chills and fever.   HENT:  Negative for ear pain and sore throat.    Eyes:  Negative for pain and visual disturbance.   Respiratory:  Negative for cough and shortness of breath.    Cardiovascular:  Negative for chest pain and palpitations.   Gastrointestinal:  Positive for nausea. Negative for abdominal pain and vomiting.   Genitourinary:  Negative for dysuria and hematuria.   Musculoskeletal:  Negative for arthralgias and back pain.   Skin:  Negative for color change and rash.   Neurological:  Positive for dizziness. Negative for seizures and syncope.   All other systems reviewed and are negative.          Objective       ED Triage Vitals [11/08/24 1625]   Temperature Pulse Blood Pressure Respirations SpO2 Patient Position - Orthostatic VS   98.5 °F (36.9 °C) 76 127/83 20 96 % Sitting      Temp Source Heart Rate Source BP Location FiO2 (%) Pain Score    Tympanic Monitor Left arm -- --      Vitals      Date and Time Temp Pulse SpO2 Resp BP Pain Score FACES Pain Rating User   11/08/24 1625 98.5 °F (36.9 °C) 76 96 % 20 127/83 -- -- RG            Physical Exam  Vitals and nursing note reviewed.   Constitutional:       General: She is not in acute distress.  HENT:      Head: Normocephalic and atraumatic.      Right Ear: External ear normal.      Left Ear: External ear normal.      Nose: Nose normal.      Mouth/Throat:      Pharynx: Oropharynx is clear.   Eyes:      Extraocular Movements: Extraocular movements intact.      Pupils: Pupils are equal, round, and reactive to light.   Cardiovascular:      Rate and Rhythm: Normal rate and regular rhythm.      Pulses: Normal pulses.      Heart sounds: Normal heart sounds. No murmur heard.     No friction rub. No gallop.   Pulmonary:      Effort: Pulmonary effort is normal. No  respiratory distress.      Breath sounds: Normal breath sounds. No wheezing, rhonchi or rales.   Abdominal:      General: Abdomen is flat. There is no distension.      Palpations: Abdomen is soft.      Tenderness: There is no abdominal tenderness. There is no guarding or rebound.   Musculoskeletal:         General: No deformity. Normal range of motion.      Cervical back: Normal range of motion.      Right lower leg: No edema.      Left lower leg: No edema.   Skin:     General: Skin is warm and dry.      Capillary Refill: Capillary refill takes less than 2 seconds.      Findings: No rash.   Neurological:      General: No focal deficit present.      Mental Status: She is alert and oriented to person, place, and time.      Cranial Nerves: No cranial nerve deficit.      Motor: No weakness.      Gait: Gait normal.   Psychiatric:         Mood and Affect: Mood normal.         Results Reviewed       None            No orders to display       Procedures    ED Medication and Procedure Management   Prior to Admission Medications   Prescriptions Last Dose Informant Patient Reported? Taking?   acetaminophen (TYLENOL) 500 mg tablet   No No   Sig: Take 2 tablets (1,000 mg total) by mouth every 8 (eight) hours as needed for mild pain or moderate pain   docusate sodium (COLACE) 100 mg capsule   No No   Sig: Take 1 capsule (100 mg total) by mouth every 12 (twelve) hours   ergocalciferol (VITAMIN D2) 50,000 units   No No   Sig: Take 1 capsule (50,000 Units total) by mouth once a week   fluticasone (FLONASE) 50 mcg/act nasal spray   No No   Si spray into each nostril daily   hydrocortisone (ANUSOL-HC) 25 mg suppository   No No   Sig: Insert 1 suppository (25 mg total) into the rectum 2 (two) times a day   lidocaine (Lidoderm) 5 %   No No   Sig: Apply 1 patch topically daily Remove & Discard patch within 12 hours or as directed by MD   loratadine (CLARITIN) 10 mg tablet   No No   Sig: Take 1 tablet (10 mg total) by mouth daily    methocarbamol (ROBAXIN) 500 mg tablet   No No   Sig: Take 1 tablet (500 mg total) by mouth daily at bedtime for 14 days   naproxen (Naprosyn) 500 mg tablet   No No   Sig: Take 1 tablet (500 mg total) by mouth 2 (two) times a day with meals   polyethylene glycol (GLYCOLAX) 17 GM/SCOOP powder   No No   Sig: Take 17 g by mouth 2 (two) times a day for 7 days   pseudoephedrine (SUDAFED) 120 MG 12 hr tablet   No No   Sig: Take 1 tablet (120 mg total) by mouth 2 (two) times a day      Facility-Administered Medications: None     Current Discharge Medication List        START taking these medications    Details   meclizine (ANTIVERT) 25 mg tablet Take 1 tablet (25 mg total) by mouth 3 (three) times a day as needed for dizziness  Qty: 30 tablet, Refills: 0    Associated Diagnoses: Dizziness      ondansetron (ZOFRAN-ODT) 4 mg disintegrating tablet Take 1 tablet (4 mg total) by mouth every 6 (six) hours as needed for nausea  Qty: 15 tablet, Refills: 0    Associated Diagnoses: Nausea           CONTINUE these medications which have NOT CHANGED    Details   acetaminophen (TYLENOL) 500 mg tablet Take 2 tablets (1,000 mg total) by mouth every 8 (eight) hours as needed for mild pain or moderate pain  Qty: 30 tablet, Refills: 0    Associated Diagnoses: Chronic bilateral low back pain without sciatica      docusate sodium (COLACE) 100 mg capsule Take 1 capsule (100 mg total) by mouth every 12 (twelve) hours  Qty: 60 capsule, Refills: 0    Associated Diagnoses: Prolapsed internal hemorrhoids      ergocalciferol (VITAMIN D2) 50,000 units Take 1 capsule (50,000 Units total) by mouth once a week  Qty: 16 capsule, Refills: 0    Associated Diagnoses: Low vitamin D level      fluticasone (FLONASE) 50 mcg/act nasal spray 1 spray into each nostril daily  Qty: 18 g, Refills: 5    Associated Diagnoses: Allergic rhinitis, unspecified seasonality, unspecified trigger      hydrocortisone (ANUSOL-HC) 25 mg suppository Insert 1 suppository (25 mg  total) into the rectum 2 (two) times a day  Qty: 12 suppository, Refills: 0    Associated Diagnoses: Prolapsed internal hemorrhoids      lidocaine (Lidoderm) 5 % Apply 1 patch topically daily Remove & Discard patch within 12 hours or as directed by MD  Qty: 90 patch, Refills: 1    Associated Diagnoses: Left hip pain      loratadine (CLARITIN) 10 mg tablet Take 1 tablet (10 mg total) by mouth daily  Qty: 90 tablet, Refills: 3    Associated Diagnoses: Allergic rhinitis, unspecified seasonality, unspecified trigger      methocarbamol (ROBAXIN) 500 mg tablet Take 1 tablet (500 mg total) by mouth daily at bedtime for 14 days  Qty: 14 tablet, Refills: 0    Associated Diagnoses: Chronic bilateral low back pain without sciatica      naproxen (Naprosyn) 500 mg tablet Take 1 tablet (500 mg total) by mouth 2 (two) times a day with meals  Qty: 30 tablet, Refills: 0    Associated Diagnoses: Chronic bilateral low back pain without sciatica      polyethylene glycol (GLYCOLAX) 17 GM/SCOOP powder Take 17 g by mouth 2 (two) times a day for 7 days  Qty: 238 g, Refills: 0    Associated Diagnoses: Prolapsed internal hemorrhoids      pseudoephedrine (SUDAFED) 120 MG 12 hr tablet Take 1 tablet (120 mg total) by mouth 2 (two) times a day  Qty: 20 tablet, Refills: 0    Associated Diagnoses: URI (upper respiratory infection)           No discharge procedures on file.  ED SEPSIS DOCUMENTATION   Time reflects when diagnosis was documented in both MDM as applicable and the Disposition within this note       Time User Action Codes Description Comment    11/8/2024  5:03 PM Pola Ovalles [R42] Dizziness     11/8/2024  5:03 PM Pola Ovalles [R11.0] Nausea     11/8/2024  5:03 PM Pola Ovalles [R51.9] Headache                  Pola Ovalles MD  11/08/24 0868